# Patient Record
Sex: FEMALE | Race: BLACK OR AFRICAN AMERICAN | URBAN - METROPOLITAN AREA
[De-identification: names, ages, dates, MRNs, and addresses within clinical notes are randomized per-mention and may not be internally consistent; named-entity substitution may affect disease eponyms.]

---

## 2020-03-05 ENCOUNTER — OFFICE VISIT (OUTPATIENT)
Dept: CARDIOLOGY CLINIC | Age: 35
End: 2020-03-05

## 2020-03-05 VITALS — HEART RATE: 88 BPM | WEIGHT: 101 LBS | SYSTOLIC BLOOD PRESSURE: 108 MMHG | DIASTOLIC BLOOD PRESSURE: 72 MMHG

## 2020-03-05 DIAGNOSIS — Z72.0 TOBACCO ABUSE: ICD-10-CM

## 2020-03-05 DIAGNOSIS — R53.83 FATIGUE, UNSPECIFIED TYPE: ICD-10-CM

## 2020-03-05 DIAGNOSIS — Z86.19 HISTORY OF SHINGLES: ICD-10-CM

## 2020-03-05 DIAGNOSIS — Z86.19 H/O ROCKY MOUNTAIN SPOTTED FEVER: ICD-10-CM

## 2020-03-05 DIAGNOSIS — R07.9 CHEST PAIN, UNSPECIFIED TYPE: Primary | ICD-10-CM

## 2020-03-05 PROBLEM — A77.0 ROCKY MOUNTAIN SPOTTED FEVER: Status: ACTIVE | Noted: 2020-03-05

## 2020-03-05 PROBLEM — B02.9 SHINGLES: Status: ACTIVE | Noted: 2020-03-05

## 2020-03-05 PROBLEM — F41.9 ANXIETY: Status: ACTIVE | Noted: 2020-03-05

## 2020-03-05 RX ORDER — LORAZEPAM 0.5 MG/1
0.5 TABLET ORAL
COMMUNITY

## 2020-03-05 NOTE — PROGRESS NOTES
HISTORY OF PRESENT ILLNESS  Júnior Wu is a 27 y.o. female. [facility] spotted fever from tick bite 3/2019, diagnosed 9/2019. Repeat Shingles infections  Chest pan has been intermittent since July 2019. The chest pan occurs with activity or with rest with radiation to left arm. She denies associated symptoms of SOB, diaphoresis or nausea. New Patient   The history is provided by the patient. Associated symptoms include chest pain. Pertinent negatives include no shortness of breath. Chest Pain (Angina)    This is a recurrent problem. The current episode started more than 1 week ago. The problem has been gradually improving. Pertinent negatives include no claudication, no cough, no dizziness, no malaise/fatigue, no nausea, no orthopnea, no palpitations, no PND, no shortness of breath and no vomiting. Review of Systems   Constitutional: Negative for malaise/fatigue. HENT: Negative for congestion. Eyes: Negative for double vision and redness. Respiratory: Negative for cough, shortness of breath and wheezing. Cardiovascular: Positive for chest pain. Negative for palpitations, orthopnea, claudication, leg swelling and PND. Gastrointestinal: Negative for nausea and vomiting. Musculoskeletal: Negative for falls. Neurological: Negative for dizziness. Endo/Heme/Allergies: Does not bruise/bleed easily. Psychiatric/Behavioral: The patient is not nervous/anxious.       Allergies   Allergen Reactions    Latex, Natural Rubber Itching    Prednisone Other (comments)     Severe anxiety, loss of appetite, insomnia       Past Medical History:   Diagnosis Date    Anxiety     Murmur, cardiac        Family History   Problem Relation Age of Onset    Hypertension Father     No Known Problems Sister        Social History     Tobacco Use    Smoking status: Current Every Day Smoker     Packs/day: 1.00     Start date: 3/5/2005    Smokeless tobacco: Never Used   Substance Use Topics    Alcohol use: Not Currently    Drug use: Yes     Frequency: 1.0 times per week     Types: Marijuana        Current Outpatient Medications   Medication Sig    LORazepam (ATIVAN) 0.5 mg tablet Take 0.5 mg by mouth. No current facility-administered medications for this visit. History reviewed. No pertinent surgical history. Visit Vitals  /72   Pulse 88   Wt 45.8 kg (101 lb)       Diagnostic Studies:  I have reviewed the relevant tests done on the patient and show as follows  EKG tracings reviewed by me today. EKG Results     Procedure 720 Value Units Date/Time    AMB POC EKG ROUTINE W/ 12 LEADS, INTER & REP [[id number]] Resulted:  03/05/20 1309    Order Status:  Completed Updated:  03/05/20 1311        XR Results (most recent):  No results found for this or any previous visit. No flowsheet data found. Ms. Jared Begum has a reminder for a \"due or due soon\" health maintenance. I have asked that she contact her primary care provider for follow-up on this health maintenance. Physical Exam  Vitals signs and nursing note reviewed. HENT:      Head: Normocephalic. Neck:      Musculoskeletal: Normal range of motion. Vascular: No carotid bruit. Cardiovascular:      Rate and Rhythm: Normal rate and regular rhythm. Pulses: Normal pulses. Heart sounds: Normal heart sounds. No murmur. No friction rub. No gallop. Pulmonary:      Effort: Pulmonary effort is normal. No respiratory distress. Breath sounds: Normal breath sounds. No stridor. No wheezing, rhonchi or rales. Chest:      Chest wall: No tenderness. Musculoskeletal: Normal range of motion. Skin:     General: Skin is warm and dry. Neurological:      Mental Status: She is alert. Psychiatric:         Mood and Affect: Mood normal.         ASSESSMENT and PLAN      3/2020 Patient referred for recurrent chest pain since diagnosis of RMSF. Chest pain is intermittent and was improving until recent bout of Shingles. EKG - NSR. Will evaluate for ischemia with ACACIA and echo. Pain is possibly related to GERD and she is to f/u with GI this Month. Encouraged smoking cessation to reduce risk of future cardiovascular events. ICD-10-CM ICD-9-CM    1. Chest pain, unspecified type R07.9 786.50 AMB POC EKG ROUTINE W/ 12 LEADS, INTER & REP      EXERCISE CARDIAC STRESS TEST      ECHO ADULT COMPLETE   2. H/O [facility] spotted fever Z86.19 V12.09    3. Tobacco abuse Z72.0 305.1    4. Fatigue, unspecified type R53.83 780.79    5. History of shingles Z86.19 V12.09        There are no discontinued medications. Orders Placed This Encounter    AMB POC EKG ROUTINE W/ 12 LEADS, INTER & REP     Order Specific Question:   Reason for Exam:     Answer:   chest pain    ECHO ADULT COMPLETE     Standing Status:   Future     Standing Expiration Date:   3/5/2021     Order Specific Question:   Is Patient Pregnant? Answer:   Unknown     Order Specific Question:   Contrast Enhancement (Bubble Study, Definity, Optison) may be used if criteria listed in established evidence-based protocol has been identified. Answer:   Yes     Order Specific Question:   Enhanced Imaging (Myocardial Strain, 3D post-processing) may be used if criteria listed in established evidence-based protocol has been identified. Answer:   Yes       Follow-up and Dispositions    · Return in about 3 months (around 6/5/2020), or if symptoms worsen or fail to improve, for Post testing.         ]I have independently evaluated and examined the patient. Atypical chest pain but given the fact that it sometimes seems to radiate to left arm, get a treadmill test to evaluate ischemia. Check echo to rule out any significant pericardial effusion and cardiomyopathy. History of [facility] spotted fever last year from which she is recovering gradually. All relevant labs and testing data are reviewed. Care plan discussed and updated after review.   Ant Brumfield MD

## 2020-03-05 NOTE — PATIENT INSTRUCTIONS
Learning About Benefits From Quitting Smoking  How does quitting smoking make you healthier? If you're thinking about quitting smoking, you may have a few reasons to be smoke-free. Your health may be one of them. · When you quit smoking, you lower your risks for cancer, lung disease, heart attack, stroke, blood vessel disease, and blindness from macular degeneration. · When you're smoke-free, you get sick less often, and you heal faster. You are less likely to get colds, flu, bronchitis, and pneumonia. · As a nonsmoker, you may find that your mood is better and you are less stressed. When and how will you feel healthier? Quitting has real health benefits that start from day 1 of being smoke-free. And the longer you stay smoke-free, the healthier you get and the better you feel. The first hours  · After just 20 minutes, your blood pressure and heart rate go down. That means there's less stress on your heart and blood vessels. · Within 12 hours, the level of carbon monoxide in your blood drops back to normal. That makes room for more oxygen. With more oxygen in your body, you may notice that you have more energy than when you smoked. After 2 weeks  · Your lungs start to work better. · Your risk of heart attack starts to drop. After 1 month  · When your lungs are clear, you cough less and breathe deeper, so it's easier to be active. · Your sense of taste and smell return. That means you can enjoy food more than you have since you started smoking. Over the years  · After 1 year, your risk of heart disease is half what it would be if you kept smoking. · After 5 years, your risk of stroke starts to shrink. Within a few years after that, it's about the same as if you'd never smoked. · After 10 years, your risk of dying from lung cancer is cut by about half. And your risk for many other types of cancer is lower too. How would quitting help others in your life?   When you quit smoking, you improve the health of everyone who now breathes in your smoke. · Their heart, lung, and cancer risks drop, much like yours. · They are sick less. For babies and small children, living smoke-free means they're less likely to have ear infections, pneumonia, and bronchitis. · If you're a woman who is or will be pregnant someday, quitting smoking means a healthier . · Children who are close to you are less likely to become adult smokers. Where can you learn more? Go to http://carolyn-dhara.info/. Enter 052 [id number] [id number] 11 in the search box to learn more about \"Learning About Benefits From Quitting Smoking. \"  Current as of: 2018  Content Version: 12.2  © 3680-4564 [employer]. Care instructions adapted under license by [employer] (which disclaims liability or warranty for this information). If you have questions about a medical condition or this instruction, always ask your healthcare professional. [place] [address] any warranty or liability for your use of this information. [employer] Activation    Thank you for requesting access to [employer]. Please follow the instructions below to securely access and download your online medical record. [employer] allows you to send messages to your doctor, view your test results, renew your prescriptions, schedule appointments, and more. How Do I Sign Up? 1. In your internet browser, go to [email or web address]. 2. Click on the First Time User? Click Here link in the Sign In box. You will see the New Member Sign Up page. 3. Enter your [employer] Access Code exactly as it appears below. You will not need to use this code after youve completed the sign-up process. If you do not sign up before the expiration date, you must request a new code. [employer] Access Code: [email or web address]  Expires: 2020  1:46 PM (This is the date your [employer] access code will )    4.  Enter the last four digits of your Social Security Number (xxxx) and Date of Birth (mm/dd/yyyy) as indicated and click Submit. You will be taken to the next sign-up page. 5. Create a FaceRig ID. This will be your FaceRig login ID and cannot be changed, so think of one that is secure and easy to remember. 6. Create a FaceRig password. You can change your password at any time. 7. Enter your Password Reset Question and Answer. This can be used at a later time if you forget your password. 8. Enter your e-mail address. You will receive e-mail notification when new information is available in [address] 9. Click Sign Up. You can now view and download portions of your medical record. 10. Click the Download Summary menu link to download a portable copy of your medical information. Additional Information    If you have questions, please visit the Frequently Asked Questions section of the FaceRig website at [email or web address] Remember, FaceRig is NOT to be used for urgent needs. For medical emergencies, dial 911.

## 2022-03-19 PROBLEM — B02.9 SHINGLES: Status: ACTIVE | Noted: 2020-03-05

## 2022-03-19 PROBLEM — F41.9 ANXIETY: Status: ACTIVE | Noted: 2020-03-05

## 2022-03-20 PROBLEM — A77.0 ROCKY MOUNTAIN SPOTTED FEVER: Status: ACTIVE | Noted: 2020-03-05

## 2023-02-20 PROBLEM — G89.29 CHRONIC MIDLINE THORACIC BACK PAIN: Status: ACTIVE | Noted: 2017-04-12

## 2023-02-20 PROBLEM — M41.9 SCOLIOSIS: Status: ACTIVE | Noted: 2017-04-12

## 2023-02-20 PROBLEM — M54.6 CHRONIC MIDLINE THORACIC BACK PAIN: Status: ACTIVE | Noted: 2017-04-12

## 2023-03-13 ASSESSMENT — ENCOUNTER SYMPTOMS: SHORTNESS OF BREATH: 0

## 2023-03-13 NOTE — PROGRESS NOTES
Ivanna De Souza is a 44 y.o. female is seen on 3/15/2023 for New Patient, Other (Reoccurring shingles from tick bites), Otalgia (Throbbing pain in right ear, does have a rotten tooth scheduled for removal), and Anxiety (Caused by shingles)    Assessment & Plan:     1. Herpes zoster with other complication  Assessment & Plan:  Referral to ID for managaement  Orders:  -     External Referral To Infectious Disease  2. [place] spotted fever  Assessment & Plan:  Referral to ID for management  3. Acute ear pain, bilateral  4. Anxiety  Assessment & Plan:  Referral to psych for management    Orders:  -     CBC; Future  -     Comprehensive Metabolic Panel; Future  -     TSH; Future  -     Amb External Referral To Psychiatry  5. Screening for lipid disorders  -     Lipid Panel; Future  6. Need for hepatitis C screening test  -     Hepatitis C Antibody; Future  7. Encounter to establish care  Comments:  Former pt ot Ana Luisa Cloud NP. Follow-up and Dispositions    Return in about 4 weeks (around 4/12/2023) for PHYSICAL, LAB RESULTS.        Subjective:     HPI    Shingles/History of [facility] spotted fever  Got RMSF March 2019, had the tick bite, was diagnosed in September 2019  No rash, stated she started feeling weird, few days felt okay, when found out, wreaked havoc on body  Started getting shingles on and off for the past 3 years  Longest been without shingles is 7 months  Tick bite few years ago  Causing her to have shingles 6 times/year  Took shingles vaccine  Treatment: first prescribed antiviral valcyclocir, thought it was shutting down kidneys, protein levels were increased and stopped taking it  Next time had shingles, started acyclovir, had bad stomach cramps  Specialists: waited 7 months to get into ID doctor, was sent to [place], was looking at old lab results; was prescribed medication     Ear pain  Onset: yesterday  Left/right ear pain  Feels warm to sides of ears  Has two teeth that need root canals  May be caused by rotten tooth  Scheduled for root canal next Monday    Anxiety  Patient is seen for anxiety  Associated symptoms:  see ANN-7  Current treatment: lorazepam PRN with shingles, has severe anxiety  Followed by psychiatry: none  ANN-7 SCREENING 3/15/2023   Feeling nervous, anxious, or on edge Several days   Not being able to stop or control worrying Several days   Worrying too much about different things Several days   Trouble relaxing Several days   Being so restless that it is hard to sit still Several days   Becoming easily annoyed or irritable Several days   Feeling afraid as if something awful might happen Not at all   ANN-7 Total Score 6   How difficult have these problems made it for you to do your work, take care of things at home, or get along with other people? Somewhat difficult     Review of Systems   HENT:  Positive for ear pain. Negative for ear discharge. Respiratory:  Negative for shortness of breath. Cardiovascular:  Negative for chest pain and leg swelling. Neurological:  Negative for dizziness, light-headedness and headaches. Objective:     Vitals:    03/15/23 1527   BP: 110/73   Site: Right Upper Arm   Position: Sitting   Cuff Size: Small Adult   Pulse: 88   Resp: 16   Temp: 98.2 °F (36.8 °C)   TempSrc: Temporal   SpO2: 100%   Weight: 101 lb (45.8 kg)   Height: 5' 4.5\" (1.638 m)     Physical Exam  Vitals and nursing note reviewed. Constitutional:       General: She is not in acute distress. Appearance: She is not ill-appearing. HENT:      Head: Normocephalic and atraumatic. Right Ear: Tympanic membrane, ear canal and external ear normal.      Left Ear: Tympanic membrane, ear canal and external ear normal.   Cardiovascular:      Rate and Rhythm: Normal rate and regular rhythm. Pulmonary:      Effort: Pulmonary effort is normal. No respiratory distress. Breath sounds: No wheezing, rhonchi or rales.    Musculoskeletal:         General: Normal range of motion. Skin:     General: Skin is warm and dry. Neurological:      General: No focal deficit present. Mental Status: She is alert. Psychiatric:         Mood and Affect: Mood normal.         Thought Content:  Thought content normal.         Judgment: Judgment normal.     JUVE Mcgarry

## 2023-03-15 ENCOUNTER — OFFICE VISIT (OUTPATIENT)
Age: 38
End: 2023-03-15
Payer: COMMERCIAL

## 2023-03-15 VITALS
HEIGHT: 65 IN | DIASTOLIC BLOOD PRESSURE: 73 MMHG | BODY MASS INDEX: 16.83 KG/M2 | HEART RATE: 88 BPM | TEMPERATURE: 98.2 F | OXYGEN SATURATION: 100 % | WEIGHT: 101 LBS | RESPIRATION RATE: 16 BRPM | SYSTOLIC BLOOD PRESSURE: 110 MMHG

## 2023-03-15 DIAGNOSIS — A77.0 ROCKY MOUNTAIN SPOTTED FEVER: ICD-10-CM

## 2023-03-15 DIAGNOSIS — B02.8 HERPES ZOSTER WITH OTHER COMPLICATION: Primary | ICD-10-CM

## 2023-03-15 DIAGNOSIS — Z13.220 SCREENING FOR LIPID DISORDERS: ICD-10-CM

## 2023-03-15 DIAGNOSIS — H92.03 ACUTE EAR PAIN, BILATERAL: ICD-10-CM

## 2023-03-15 DIAGNOSIS — F41.9 ANXIETY: ICD-10-CM

## 2023-03-15 DIAGNOSIS — Z11.59 NEED FOR HEPATITIS C SCREENING TEST: ICD-10-CM

## 2023-03-15 DIAGNOSIS — Z76.89 ENCOUNTER TO ESTABLISH CARE: ICD-10-CM

## 2023-03-15 PROCEDURE — 99204 OFFICE O/P NEW MOD 45 MIN: CPT

## 2023-03-15 SDOH — ECONOMIC STABILITY: FOOD INSECURITY: WITHIN THE PAST 12 MONTHS, YOU WORRIED THAT YOUR FOOD WOULD RUN OUT BEFORE YOU GOT MONEY TO BUY MORE.: SOMETIMES TRUE

## 2023-03-15 SDOH — ECONOMIC STABILITY: FOOD INSECURITY: WITHIN THE PAST 12 MONTHS, THE FOOD YOU BOUGHT JUST DIDN'T LAST AND YOU DIDN'T HAVE MONEY TO GET MORE.: SOMETIMES TRUE

## 2023-03-15 SDOH — ECONOMIC STABILITY: HOUSING INSECURITY
IN THE LAST 12 MONTHS, WAS THERE A TIME WHEN YOU DID NOT HAVE A STEADY PLACE TO SLEEP OR SLEPT IN A SHELTER (INCLUDING NOW)?: NO

## 2023-03-15 SDOH — ECONOMIC STABILITY: INCOME INSECURITY: HOW HARD IS IT FOR YOU TO PAY FOR THE VERY BASICS LIKE FOOD, HOUSING, MEDICAL CARE, AND HEATING?: SOMEWHAT HARD

## 2023-03-15 ASSESSMENT — ANXIETY QUESTIONNAIRES
3. WORRYING TOO MUCH ABOUT DIFFERENT THINGS: 1
2. NOT BEING ABLE TO STOP OR CONTROL WORRYING: 1
IF YOU CHECKED OFF ANY PROBLEMS ON THIS QUESTIONNAIRE, HOW DIFFICULT HAVE THESE PROBLEMS MADE IT FOR YOU TO DO YOUR WORK, TAKE CARE OF THINGS AT HOME, OR GET ALONG WITH OTHER PEOPLE: SOMEWHAT DIFFICULT
4. TROUBLE RELAXING: 1
7. FEELING AFRAID AS IF SOMETHING AWFUL MIGHT HAPPEN: 0
GAD7 TOTAL SCORE: 6
5. BEING SO RESTLESS THAT IT IS HARD TO SIT STILL: 1
6. BECOMING EASILY ANNOYED OR IRRITABLE: 1
1. FEELING NERVOUS, ANXIOUS, OR ON EDGE: 1

## 2023-03-15 ASSESSMENT — PATIENT HEALTH QUESTIONNAIRE - PHQ9
SUM OF ALL RESPONSES TO PHQ QUESTIONS 1-9: 0
1. LITTLE INTEREST OR PLEASURE IN DOING THINGS: 0
SUM OF ALL RESPONSES TO PHQ QUESTIONS 1-9: 0
SUM OF ALL RESPONSES TO PHQ9 QUESTIONS 1 & 2: 0
SUM OF ALL RESPONSES TO PHQ QUESTIONS 1-9: 0
SUM OF ALL RESPONSES TO PHQ QUESTIONS 1-9: 0
2. FEELING DOWN, DEPRESSED OR HOPELESS: 0

## 2023-03-15 NOTE — PROGRESS NOTES
Chief Complaint   Patient presents with    New Patient    Other     Reoccurring shingles from tick bites    Otalgia     Throbbing pain in right ear, does have a rotten tooth scheduled for removal    Anxiety     Caused by shingles

## 2023-03-15 NOTE — PATIENT INSTRUCTIONS
FINANCIAL RESOURCES    [facility] Financial Assistance  What they offer: The [employer] Program helps uninsured patients who do not qualify for government-sponsored health insurance and cannot afford to pay for their medical care. Insured patient may also qualify for assistance based on family income, family size, and medical needs. Phone Number: [phone]  How to apply for the [employer] Program:            #    Option 1: To apply for financial assistance, a patient (or their family or other provider) should fill out the Financial Assistance Application. Copies of the Financial Assistance Application and the FAP may be obtained for free by calling the [facility] customer service department at [phone]. #    Option 2:  The Financial Assistance Application and policy may be obtained for free by downloading a copy from the JethroData:                     Twibingo/patient-resources/financial-assistance                     #   Applications are available in several languages on the website    The Folloze. iFrat Warss. org  What they offer: The Local Plant SourceAlejandraMotionsoft Program can assist you if youre uninsured and have been diagnosed with chronic, debilitating, or life-threatening disease. Phone Number: 3-715.791.1978  Website: www. Nexavis    UTILITIES    CommonHelp  What they offer: Partnership with the [place] [address]. Assist with   finding and applying for government funded programs and benefits. You can also update  your benefits or report changes through [employer].   Website: Innal[employer] Holding.Red Blue Voice  Phone Number: 860-2WOXELF ([phone])      Fabricio 99 they offer: Krystal Chi is LifePoint HealthReveal energy assistance program of last resort for                 anyone who faces financial hardships from unemployment or family crisis. o Phone Number: [phone]        o Address: [place], [address]        o Website: Arran Aromatics/[place]/billing/billing-options/energyshare     Energy Assistance Programs (EAP) - Department of           o What they offer: EAP assists low income households in meeting their immediate home energy needs. o Phone Number: 390.603.8474 or contact your local department of         o Website: https://VarVeener.Mapiliary/     Good Rx   o What they offer: Good Rx tracks prescription drug prices and provides free drug coupons for discounts on medications. o Website: Influx/    NeedyMeds   o What they offer: NeedyMeds offers free information on medications and healthcare cost savings programs including prescription assistance programs, coupons, and discount programs. o Website: PaymentBack.VideoNot.es org/   o Helpline: 425.368.3059     RX Assist   o What they offer: Information about free and low-cost medicine programs. o Website: https://Sedia Biosciences/    [employer] $4 Prescription Program   o What they offer: Prescription Program includes up to a 30-day supply for $4 and a 90-day supply for $10 of some covered generic drugs at commonly prescribed dosages   o Website: Kelsie      The Co-Pay Relief Program  What they offer: The Co-Pay Relief Program provides you with direct prescription co-payment assistance, if you are an insured [place] citizen and financially and medically qualify, including Medicare Part D beneficiaries who require assistance with their prescription drug co-payments. Phone toll-free: 0-280.393.3118  Website: www.Dwolla. org    The Partnership for Prescription Assistance   What they offer:   The Partnership for Prescription Assistance can help you if you lack Prescription coverage to get the medicines you need through the public or private program that is right for you. Phone toll-free: 7-804.722.5129  Website:  www. pparx. [address]  o    What they offer: The [address] [place] [address] gives you and your family access to a free Prescription Drug Card program and savings of up to 75% at more than 50,000 national and The [employer]. Phone toll-free: 4-124.534.7712  Website: www.Share Your Brain    additional resources? Call 211 or Find Help: https://www. atOnePlace.com.Fuzz/ FOOD RESOURCES    [facility]  What they offer: Assistance with finding a food pantry, soup kitchen or resource near you. Website: https://Vacatia.org/get-help/community-resources/  Provide instructions for assistance with Karthik Weathers (Food [place]) application on this site. SNAP ([address])  What they offer: SNAP is used like cash to buy eligible food items from authorized retailers. Apply for benefits online: Seun.  Apply for benefits by telephone: [phone]    Meals on Wheels  What they offer: Meals on Wheels is a program that delivers meals to individuals at home who are unable to purchase or prepare their own meals. Website: https://First Coveragemore. org/how-we-help/meals-on-wheels/  Requirements can vary by program and areas served. To apply:  [address] [place] [address] Avenue: [phone] (COG -Fri 8:30 a.m. to 4:30 p.m.)  Coverage Area:  [facility], [address] [place] [address] [address] [facility],[address]  Website: www.ssseva.org/contact-us/  Tamara Shetty 39 On [phone]   Coverage Areas: [place], [address] [place] [address], [place], [place]. [facility] [address]  What they offer:  [facility]asis provides comprehensive services to the disadvantaged/low-income community, homebound seniors and homeless in [place], [facility], and [facility].   Phone Number: [phone]  Services:  Food pantry, [address], Senior Grocery Delivery Program, Food Bank, Provides assistance with completing [employer]. Website: https://www.Wochit/. org/services    Need additional resources? Call 211 or Find Help: https://www. findhelp.org/

## 2023-04-16 PROBLEM — Z00.00 ANNUAL PHYSICAL EXAM: Status: ACTIVE | Noted: 2023-04-16

## 2023-04-16 PROBLEM — Z00.00 ANNUAL PHYSICAL EXAM: Status: RESOLVED | Noted: 2023-04-16 | Resolved: 2023-04-16

## 2023-04-16 ASSESSMENT — ENCOUNTER SYMPTOMS
ABDOMINAL PAIN: 0
BLOOD IN STOOL: 0
CONSTIPATION: 0
VOMITING: 0
DIARRHEA: 0
COUGH: 0
SHORTNESS OF BREATH: 0
NAUSEA: 0
CHEST TIGHTNESS: 0

## 2023-04-17 ENCOUNTER — OFFICE VISIT (OUTPATIENT)
Facility: CLINIC | Age: 38
End: 2023-04-17
Payer: COMMERCIAL

## 2023-04-17 VITALS
SYSTOLIC BLOOD PRESSURE: 111 MMHG | HEART RATE: 86 BPM | DIASTOLIC BLOOD PRESSURE: 71 MMHG | OXYGEN SATURATION: 100 % | RESPIRATION RATE: 16 BRPM | BODY MASS INDEX: 16.66 KG/M2 | HEIGHT: 65 IN | WEIGHT: 100 LBS | TEMPERATURE: 98.2 F

## 2023-04-17 DIAGNOSIS — B02.9 HERPES ZOSTER WITHOUT COMPLICATION: ICD-10-CM

## 2023-04-17 DIAGNOSIS — Z71.2 ENCOUNTER TO DISCUSS TEST RESULTS: ICD-10-CM

## 2023-04-17 DIAGNOSIS — F41.9 ANXIETY: ICD-10-CM

## 2023-04-17 DIAGNOSIS — Z00.00 ANNUAL PHYSICAL EXAM: Primary | ICD-10-CM

## 2023-04-17 PROCEDURE — 99395 PREV VISIT EST AGE 18-39: CPT

## 2023-04-17 PROCEDURE — 99213 OFFICE O/P EST LOW 20 MIN: CPT

## 2023-04-17 ASSESSMENT — PATIENT HEALTH QUESTIONNAIRE - PHQ9
1. LITTLE INTEREST OR PLEASURE IN DOING THINGS: 0
SUM OF ALL RESPONSES TO PHQ QUESTIONS 1-9: 0
SUM OF ALL RESPONSES TO PHQ9 QUESTIONS 1 & 2: 0
SUM OF ALL RESPONSES TO PHQ QUESTIONS 1-9: 0
2. FEELING DOWN, DEPRESSED OR HOPELESS: 0

## 2023-04-17 NOTE — PROGRESS NOTES
Determinants of Health     Tobacco Use: High Risk    Smoking Tobacco Use: Every Day    Smokeless Tobacco Use: Never    Passive Exposure: Not on file   Alcohol Use: Not on file   Financial Resource Strain: Medium Risk    Difficulty of Paying Living Expenses: Somewhat hard   Food Insecurity: Food Insecurity Present    Worried About Running Out of Food in the Last Year: Sometimes true    Ran Out of Food in the Last Year: Sometimes true   Transportation Needs: Unknown    Lack of Transportation (Medical): Not on file    Lack of Transportation (Non-Medical): No   Physical Activity: Not on file   Stress: Not on file   Social Connections: Not on file   Intimate Partner Violence: Not on file   Depression: Not at risk    PHQ-2 Score: 0   Housing Stability: Unknown    Unable to Pay for Housing in the Last Year: Not on file    Number of Places Lived in the Last Year: Not on file    Unstable Housing in the Last Year: No        Health Maintenance Due   Topic Date Due    COVID-19 Vaccine (1) Never done    Varicella vaccine (1 of 2 - 2-dose childhood series) Never done    Pneumococcal 0-64 years Vaccine (1 - PCV) Never done    HIV screen  Never done    DTaP/Tdap/Td vaccine (1 - Tdap) Never done    Cervical cancer screen  03/16/2023   . Coordination of Care:  1. Have you been to the ER, urgent care clinic since your last visit? Hospitalized since your last visit? no    2. Have you seen or consulted any other health care providers outside of the [address] [place] [address] since your last visit? Include any pap smears or colon screening.  no

## 2023-05-08 ENCOUNTER — TELEPHONE (OUTPATIENT)
Facility: CLINIC | Age: 38
End: 2023-05-08

## 2023-05-08 DIAGNOSIS — B02.9 HERPES ZOSTER WITHOUT COMPLICATION: Primary | ICD-10-CM

## 2023-05-08 NOTE — TELEPHONE ENCOUNTER
Called [facility] and spoke to [facility] who says she does not have the referral from 3/15/2023 on file. I verified fax number listed on previous referral and she stated that it was correct. Informed Preeti I will ask TAMI Neff to put in a new referral and fax it over today and attention to Dr. Ryan Morton.

## 2023-05-29 NOTE — PROGRESS NOTES
Chief Complaint   Patient presents with    Gynecologic Exam    Melanoma     Pt states she has a mole r. Side of chest that has changed in colors was previously skin color then changed to a dark black     Assessment & Plan:     1. Dysplastic nevus of skin  Assessment & Plan:  Referral to dermatology for management  Orders:  -     Amb External Referral To Dermatology  2. Other postherpetic nervous system involvement  Assessment & Plan:  Stable  Referral to immunology for management  Orders:  -     External Referral To Allergy  3. Anxiety  Assessment & Plan:  Continue management per psychiatry  4. Vaginal discharge  -     Nuswab Vaginitis with Candida (Six Species); Future  5. Well woman exam  -     PAP IG, Aptima HPV and rfx 16/18,45 (641004); Future  6. Encounter for completion of form with patient  Comments:  Completed [facility] Medical Evaluation form     Follow-up and Dispositions    Return in about 3 months (around 2023) for anxiety, change in mole, shingles.        Subjective:     HPI    OB/Gyn Hx  Last PAP: 4-5 years ago  Hx of abnormal PAP: none  STDs: history of HPV    Date of LMP: 6 weeks ago  Birth Control: none  Hysterectomy: none  Family hx of breast/cervical/uterine/ovarian cancer: mother (breast)  Breast complaints: none  Vaginal symptoms: none    Mole  Onset: has had it for awhile  Location: left upper abdomen  Duration: worse over the last year  Characteristics: no pain but itchy, was tan in color, and is dark  Associated symptoms: none    Shingles  States she is unable to work d/t shingles pain  Has extreme fatigue  Physical limitations: unable to sit for long than 20 minutes at a time  Has not received varicella vaccine    Anxiety  Met with psychiatry, trying to prescibe something  Last appointment: 23  Next appointment: 23  Psych will work on finding therapist  Anxiety: stress can trigger shingles    Objective:     Vitals:    23 1440   BP: 112/76   Site: Right Upper Arm

## 2023-05-31 ENCOUNTER — HOSPITAL ENCOUNTER (OUTPATIENT)
Facility: HOSPITAL | Age: 38
Setting detail: SPECIMEN
Discharge: HOME | End: 2023-06-03
Payer: COMMERCIAL

## 2023-05-31 ENCOUNTER — OFFICE VISIT (OUTPATIENT)
Facility: CLINIC | Age: 38
End: 2023-05-31
Payer: COMMERCIAL

## 2023-05-31 VITALS
HEIGHT: 65 IN | SYSTOLIC BLOOD PRESSURE: 112 MMHG | WEIGHT: 101 LBS | OXYGEN SATURATION: 98 % | TEMPERATURE: 98.4 F | RESPIRATION RATE: 16 BRPM | BODY MASS INDEX: 16.83 KG/M2 | HEART RATE: 91 BPM | DIASTOLIC BLOOD PRESSURE: 76 MMHG

## 2023-05-31 DIAGNOSIS — N89.8 VAGINAL DISCHARGE: ICD-10-CM

## 2023-05-31 DIAGNOSIS — Z02.89 ENCOUNTER FOR COMPLETION OF FORM WITH PATIENT: ICD-10-CM

## 2023-05-31 DIAGNOSIS — F41.9 ANXIETY: ICD-10-CM

## 2023-05-31 DIAGNOSIS — B02.29 OTHER POSTHERPETIC NERVOUS SYSTEM INVOLVEMENT: ICD-10-CM

## 2023-05-31 DIAGNOSIS — D23.9 DYSPLASTIC NEVUS OF SKIN: Primary | ICD-10-CM

## 2023-05-31 DIAGNOSIS — D23.9 DYSPLASTIC NEVUS OF SKIN: ICD-10-CM

## 2023-05-31 DIAGNOSIS — Z01.419 WELL WOMAN EXAM: ICD-10-CM

## 2023-05-31 PROCEDURE — 88175 CYTOPATH C/V AUTO FLUID REDO: CPT

## 2023-05-31 PROCEDURE — 99215 OFFICE O/P EST HI 40 MIN: CPT

## 2023-05-31 PROCEDURE — 87801 DETECT AGNT MULT DNA AMPLI: CPT

## 2023-05-31 PROCEDURE — 87624 HPV HI-RISK TYP POOLED RSLT: CPT

## 2023-05-31 PROCEDURE — 87798 DETECT AGENT NOS DNA AMP: CPT

## 2023-05-31 PROCEDURE — 87661 TRICHOMONAS VAGINALIS AMPLIF: CPT

## 2023-05-31 NOTE — PROGRESS NOTES
Qamar Rich presents today for   Chief Complaint   Patient presents with    Gynecologic Exam    Melanoma     Pt states she has a mole r. Side of chest that has changed in colors was previously skin color then changed to a dark black       Is someone accompanying this pt? no    Is the patient using any DME equipment during [address]? no    Depression Screening:  PHQ-9 Questionaire 4/17/2023 3/15/2023   Little interest or pleasure in doing things 0 0   Feeling down, depressed, or hopeless 0 0   PHQ-9 Total Score 0 0        ANN 7-Anxiety   ANN-7 SCREENING 3/15/2023   Feeling nervous, anxious, or on edge Several days   Not being able to stop or control worrying Several days   Worrying too much about different things Several days   Trouble relaxing Several days   Being so restless that it is hard to sit still Several days   Becoming easily annoyed or irritable Several days   Feeling afraid as if something awful might happen Not at all   ANN-7 Total Score 6   How difficult have these problems made it for you to do your work, take care of things at home, or get along with other people? Somewhat difficult        Learning Assessment:  No question data found. Fall Risk  No flowsheet data found. Travel Screening:    Travel Screening     No screening recorded since 05/30/23 0000       Travel History   Travel since 04/30/23    No documented travel since 04/30/23          Health Maintenance reviewed and discussed and ordered per Provider.   Social Determinants of Health     Tobacco Use: High Risk    Smoking Tobacco Use: Every Day    Smokeless Tobacco Use: Never    Passive Exposure: Not on file   Alcohol Use: Not on file   Financial Resource Strain: Medium Risk    Difficulty of Paying Living Expenses: Somewhat hard   Food Insecurity: Food Insecurity Present    Worried About Running Out of Food in the Last Year: Sometimes true    Ran Out of Food in the Last Year: Sometimes true   Transportation Needs: Unknown    Lack of

## 2023-06-01 PROBLEM — N89.8 VAGINAL DISCHARGE: Status: ACTIVE | Noted: 2023-06-01

## 2023-06-04 LAB
A VAGINAE DNA VAG QL NAA+PROBE: NORMAL SCORE
BACTERIAL VAGINOSIS, NAA: NORMAL
BVAB2 DNA VAG QL NAA+PROBE: NORMAL SCORE
C ALBICANS DNA VAG QL NAA+PROBE: NEGATIVE
C GLABRATA DNA VAG QL NAA+PROBE: NEGATIVE
CANDIDA KRUSEI: NEGATIVE
CANDIDA LUSITANIAE, NAA, 180015: NEGATIVE
CANDIDA PARAPSILOSIS/TROPICALIS: NEGATIVE
MEGA1 DNA VAG QL NAA+PROBE: NORMAL SCORE
SPECIMEN SOURCE: NORMAL
T VAGINALIS RRNA SPEC QL NAA+PROBE: NEGATIVE

## 2023-06-05 ENCOUNTER — TELEPHONE (OUTPATIENT)
Facility: CLINIC | Age: 38
End: 2023-06-05

## 2023-06-05 NOTE — TELEPHONE ENCOUNTER
[facility] called and states they do not see patients for Other postherpetic nervous system involvement. Please advise.

## 2023-08-28 PROBLEM — F41.1 GENERALIZED ANXIETY DISORDER: Status: ACTIVE | Noted: 2020-03-05

## 2023-08-28 ASSESSMENT — ENCOUNTER SYMPTOMS: SHORTNESS OF BREATH: 0

## 2023-08-28 NOTE — PROGRESS NOTES
Chief Complaint   Patient presents with    Anxiety    Mole     Dermatologists stated mole was not cancerous    Herpes Zoster     Pt states she had a breakout last Monday. States she went 4 months without a breakout. Assessment & Plan:     1. Dysplastic nevus of skin  Assessment & Plan:  Improvement  Continue management per dermatology   2. Shingles (herpes zoster) polyneuropathy  Assessment & Plan:  Start meloxicam for PRN pain  Unable to start gabapentin, as pt smokes marijuana  Referral to [facility] asthma, allergy, and immunology for management  Orders:  -     meloxicam (MOBIC) 7.5 MG tablet; Take 1 tablet by mouth daily, Disp-60 tablet, R-0Normal  -     External Referral To Allergy  3. Generalized anxiety disorder  Assessment & Plan:  Continue management per psychiatry   Orders:  -     External Referral To Psychiatry    Follow-up and Dispositions    Return in about 3 months (around 11/30/2023) for Shingles. Subjective:     HPI    Change in mole  Followed by dermatology: yes, was told it was not a mole  Stated it was closest thing to cancer, that is not a mole  Stated did not need a biopsy, did do a full body check    Shingles  Symptoms: last month had a breakout, states its completely healed, and does hurt  Cannot find a spot, sometimes feels like something is burning   Was prescribed opiate medication the first time she had shingles, did not like how she made her feel  Has not gotten the shingles vaccine, was told not to get the shingles vaccine in the past  Followed by immunology: none    Anxiety  Had a hard time finding a psychiatrist  Saw a psychiatrist, was told to stop marijuana smoking  Saw another psychiatrist, saw her once but next appt was cancelled, had issues with insurance  Was given prescription for 10 tablets of Ativan    Review of Systems   Respiratory:  Negative for shortness of breath. Cardiovascular:  Negative for chest pain and leg swelling.    Neurological:  Negative for dizziness,

## 2023-08-28 NOTE — ASSESSMENT & PLAN NOTE
Start meloxicam for PRN pain  Unable to start gabapentin, as pt smokes marijuana  Referral to [facility] asthma, allergy, and immunology for management

## 2023-08-30 ENCOUNTER — OFFICE VISIT (OUTPATIENT)
Facility: CLINIC | Age: 38
End: 2023-08-30
Payer: COMMERCIAL

## 2023-08-30 VITALS
TEMPERATURE: 97.9 F | OXYGEN SATURATION: 98 % | BODY MASS INDEX: 16.89 KG/M2 | HEIGHT: 65 IN | RESPIRATION RATE: 17 BRPM | HEART RATE: 82 BPM | WEIGHT: 101.4 LBS | DIASTOLIC BLOOD PRESSURE: 69 MMHG | SYSTOLIC BLOOD PRESSURE: 104 MMHG

## 2023-08-30 DIAGNOSIS — F41.1 GENERALIZED ANXIETY DISORDER: ICD-10-CM

## 2023-08-30 DIAGNOSIS — B02.23 SHINGLES (HERPES ZOSTER) POLYNEUROPATHY: ICD-10-CM

## 2023-08-30 DIAGNOSIS — D23.9 DYSPLASTIC NEVUS OF SKIN: Primary | ICD-10-CM

## 2023-08-30 PROBLEM — N89.8 VAGINAL DISCHARGE: Status: RESOLVED | Noted: 2023-06-01 | Resolved: 2023-08-30

## 2023-08-30 PROCEDURE — 99214 OFFICE O/P EST MOD 30 MIN: CPT

## 2023-08-30 RX ORDER — MELOXICAM 7.5 MG/1
7.5 TABLET ORAL DAILY
Qty: 60 TABLET | Refills: 0 | Status: SHIPPED | OUTPATIENT
Start: 2023-08-30

## 2023-08-30 ASSESSMENT — PATIENT HEALTH QUESTIONNAIRE - PHQ9
2. FEELING DOWN, DEPRESSED OR HOPELESS: 0
SUM OF ALL RESPONSES TO PHQ QUESTIONS 1-9: 0
SUM OF ALL RESPONSES TO PHQ QUESTIONS 1-9: 0
SUM OF ALL RESPONSES TO PHQ9 QUESTIONS 1 & 2: 0
SUM OF ALL RESPONSES TO PHQ QUESTIONS 1-9: 0
SUM OF ALL RESPONSES TO PHQ QUESTIONS 1-9: 0
1. LITTLE INTEREST OR PLEASURE IN DOING THINGS: 0

## 2023-08-30 NOTE — PROGRESS NOTES
Jamse Nails presents today for   Chief Complaint   Patient presents with    Anxiety    Mole     Dermatologists stated mole was not cancerous    Herpes Zoster     Pt states she had a breakout last Monday. States she went 4 months without a breakout. Is someone accompanying this pt? no    Is the patient using any DME equipment during [facility]? no    Depression Screening:  PHQ-9 Questionaire 8/30/2023 4/17/2023 3/15/2023   Little interest or pleasure in doing things 0 0 0   Feeling down, depressed, or hopeless 0 0 0   PHQ-9 Total Score 0 0 0        ANN 7-Anxiety   ANN-7 SCREENING 3/15/2023   Feeling nervous, anxious, or on edge Several days   Not being able to stop or control worrying Several days   Worrying too much about different things Several days   Trouble relaxing Several days   Being so restless that it is hard to sit still Several days   Becoming easily annoyed or irritable Several days   Feeling afraid as if something awful might happen Not at all   ANN-7 Total Score 6   How difficult have these problems made it for you to do your work, take care of things at home, or get along with other people? Somewhat difficult        Learning Assessment:  No question data found. Fall Risk  No flowsheet data found. Travel Screening:    Travel Screening       Question Response    In the last 10 days, have you been in contact with someone who was confirmed or suspected to have Coronavirus/COVID-19? --    Have you had a COVID-19 viral test in the last 10 days? No    Do you have any of the following new or worsening symptoms? None of these    Have you traveled internationally or domestically in the last month? No          Travel History   Travel since 07/30/23    No documented travel since 07/30/23          Health Maintenance reviewed and discussed and ordered per Provider.   Social Determinants of Health     Tobacco Use: High Risk    Smoking Tobacco Use: Every Day    Smokeless Tobacco Use: Never    Passive

## 2023-10-18 ENCOUNTER — TELEPHONE (OUTPATIENT)
Facility: CLINIC | Age: 38
End: 2023-10-18

## 2023-10-18 NOTE — TELEPHONE ENCOUNTER
Pt called and advised referral for [facility] was sent out on Sept 4, 2023.  Pt given [facility] number to call to check on referral. [phone]

## 2023-10-18 NOTE — TELEPHONE ENCOUNTER
Pt called in to let milana dyer know that she still hasn't heard from the specialists states that np told her to call back if she didn't

## 2023-11-03 ENCOUNTER — TELEPHONE (OUTPATIENT)
Facility: CLINIC | Age: 38
End: 2023-11-03

## 2023-11-03 NOTE — TELEPHONE ENCOUNTER
Pt called in stating that her referral that was sent to immunology was not sent to the right fax number [phone] she called to get schd and they said it was not received the right fax number and then they also wants recent labs and office notes and a report stating why she needs to see a immunologist

## 2023-11-07 ENCOUNTER — TELEPHONE (OUTPATIENT)
Facility: CLINIC | Age: 38
End: 2023-11-07

## 2023-11-07 NOTE — TELEPHONE ENCOUNTER
Meghan Sofia returned call from [facility]. She states that the allergist would deny the referral due to shingles.    She states that the decision came directly from the allergist.

## 2023-11-07 NOTE — TELEPHONE ENCOUNTER
Called the [facility] office and they states that Maricruz Larsen will give me a call back in regards to patient. Maricruz Larsen returned call. She states that the allergist looked over referral however, it is not suitable for an allergist nor immunology. She states that the referral did not have much information. Explained to Maricruz Larsen that the patient was coming there for shingles per provider notes. She states that she will e-mail the allergist to see if that will suffice if not she will give our office a call back.

## 2023-11-07 NOTE — TELEPHONE ENCOUNTER
Meghan Sofia called from [facility] stating that the referral that was sent over an Immunologist looked over the referral and stated that it's not allergy nor immunology appropriate and not sure what the patient is being seen for.  She said to give their office a call back if you have any questions [phone]

## 2023-11-22 ASSESSMENT — ENCOUNTER SYMPTOMS: SHORTNESS OF BREATH: 0

## 2023-11-27 ENCOUNTER — OFFICE VISIT (OUTPATIENT)
Facility: CLINIC | Age: 38
End: 2023-11-27
Payer: COMMERCIAL

## 2023-11-27 VITALS
SYSTOLIC BLOOD PRESSURE: 96 MMHG | HEART RATE: 88 BPM | WEIGHT: 104 LBS | OXYGEN SATURATION: 97 % | DIASTOLIC BLOOD PRESSURE: 65 MMHG | TEMPERATURE: 97.2 F | HEIGHT: 65 IN | BODY MASS INDEX: 17.33 KG/M2

## 2023-11-27 DIAGNOSIS — M41.87 OTHER FORM OF SCOLIOSIS OF LUMBOSACRAL SPINE: ICD-10-CM

## 2023-11-27 DIAGNOSIS — B02.8 HERPES ZOSTER WITH COMPLICATION: Primary | ICD-10-CM

## 2023-11-27 PROCEDURE — 99213 OFFICE O/P EST LOW 20 MIN: CPT

## 2023-11-27 ASSESSMENT — PATIENT HEALTH QUESTIONNAIRE - PHQ9
SUM OF ALL RESPONSES TO PHQ QUESTIONS 1-9: 0
SUM OF ALL RESPONSES TO PHQ QUESTIONS 1-9: 0
SUM OF ALL RESPONSES TO PHQ9 QUESTIONS 1 & 2: 0
SUM OF ALL RESPONSES TO PHQ QUESTIONS 1-9: 0
SUM OF ALL RESPONSES TO PHQ QUESTIONS 1-9: 0
2. FEELING DOWN, DEPRESSED OR HOPELESS: 0
1. LITTLE INTEREST OR PLEASURE IN DOING THINGS: 0

## 2023-11-27 ASSESSMENT — ENCOUNTER SYMPTOMS: BACK PAIN: 1

## 2023-11-27 NOTE — ASSESSMENT & PLAN NOTE
Continue current regimen  Advised pt to bring in copies of previous xrays to next appointment, to determine if spine is pressing against adrenal gland

## 2023-11-27 NOTE — ASSESSMENT & PLAN NOTE
Check varicella antibody  Check Rh factor, CRP, sed rate to r/o rheumatologic disorder d/t ongoing fatigue, weakness  Declined by [facility] immunology, allergy  Advised pt to research allergists, immunologist, specialists, and will order referral

## 2023-12-26 NOTE — PROGRESS NOTES
Crystal Bailey is a 43 y.o. female who was seen by synchronous (real-time) audio-video technology on 12/27/2023 for Other (Food stamp form )    Assessment & Plan:     1. Herpes zoster with complication  Assessment & Plan:  Advised pt to complete previously ordered labs (varicella antibody, RH factor, CRP, sed rate)  Advised pt to continue to research allergists, immunologists, and specialists, and will order referral  Orders:  -     CBC with Auto Differential; Future  -     TSH; Future  -     External Referral To Endocrinology  2. Ke[place]'s disease [facility])  Assessment & Plan:  Symptomatic  Referral to endocrinologist for work up   Orders:  -     CBC with Auto Differential; Future  -     Comprehensive Metabolic Panel; Future  -     TSH; Future  -     External Referral To Endocrinology  3. Chronic midline thoracic back pain  Assessment & Plan:  Persistent  Check thoracic xray  Referral to PT and ortho for management   Orders:  [facility] In [address], [address]     [address] [facility] [address] [facility])  -     XR THORACIC SPINE (2 VIEWS); Future  -     CBC with Auto Differential; Future  -     Comprehensive Metabolic Panel; Future  -     TSH; Future  4. Encounter for completion of form with patient     Follow-up and Dispositions    Return in about 4 weeks (around 1/24/2024) for blood pressure, shingles, back pain, osakr's .        SUBJECTIVE:     HPI    Shingles  States she called a specialist for shingles: and is awaiting call from them back  Followed by endocrinologist: is awaiting a call  States she researched and states she found that oskar's disease could cause a rash resembling shingles  States she is unable to take prednisone    Keewatin's disease  States she has not been tested for oskar's disease  Low blood pressure   Weight loss: states she has lost weight and can't gain weight, normal weight is 110lbs, max weight

## 2023-12-27 ENCOUNTER — TELEMEDICINE (OUTPATIENT)
Facility: CLINIC | Age: 38
End: 2023-12-27
Payer: COMMERCIAL

## 2023-12-27 ENCOUNTER — TELEPHONE (OUTPATIENT)
Facility: CLINIC | Age: 38
End: 2023-12-27

## 2023-12-27 DIAGNOSIS — M54.6 CHRONIC MIDLINE THORACIC BACK PAIN: ICD-10-CM

## 2023-12-27 DIAGNOSIS — Z02.89 ENCOUNTER FOR COMPLETION OF FORM WITH PATIENT: ICD-10-CM

## 2023-12-27 DIAGNOSIS — G89.29 CHRONIC MIDLINE THORACIC BACK PAIN: ICD-10-CM

## 2023-12-27 DIAGNOSIS — E27.1 ADDISON'S DISEASE (HCC): ICD-10-CM

## 2023-12-27 DIAGNOSIS — B02.8 HERPES ZOSTER WITH COMPLICATION: Primary | ICD-10-CM

## 2023-12-27 PROCEDURE — 99215 OFFICE O/P EST HI 40 MIN: CPT

## 2023-12-27 NOTE — TELEPHONE ENCOUNTER
Please advise pt form is completed and ready for . Please give patient order for thoracic xray, and labs ordered 12/27/23 and 11/27/23 for completion. Thank you!

## 2023-12-27 NOTE — PROGRESS NOTES
VITA ZAPITANO presents today for   Chief Complaint   Patient presents with    Other     Food stamp form        Virtual/telephone visit    Is someone accompanying this pt? no      Depression Screenin/27/2023     1:46 PM 2023     3:30 PM 2023     2:47 PM 3/15/2023     3:40 PM   PHQ-9 Questionaire   Little interest or pleasure in doing things 0 0 0 0   Feeling down, depressed, or hopeless 0 0 0 0   PHQ-9 Total Score 0 0 0 0        ANN 7-Anxiety       2023     9:44 AM 3/15/2023     3:41 PM   ANN-7 SCREENING   Feeling nervous, anxious, or on edge Not at all Several days   Not being able to stop or control worrying Not at all Several days   Worrying too much about different things Not at all Several days   Trouble relaxing Not at all Several days   Being so restless that it is hard to sit still Not at all Several days   Becoming easily annoyed or irritable Not at all Several days   Feeling afraid as if something awful might happen Not at all Not at all   ANN-7 Total Score 0 6   How difficult have these problems made it for you to do your work, take care of things at home, or get along with other people? Not difficult at all Somewhat difficult        Learning Assessment:  No question data found. Fall Risk       No data to display                   Travel Screening:    Travel Screening       Question Response    Have you been in contact with someone who was sick? No / Unsure    Do you have any of the following new or worsening symptoms? None of these    Have you traveled internationally or domestically in the last month? No          Travel History   Travel since 23    No documented travel since 23          Health Maintenance reviewed and discussed and ordered per Provider.   Social Determinants of Health     Tobacco Use: High Risk (2023)    Patient History     Smoking Tobacco Use: Every Day     Smokeless Tobacco Use: Never     Passive Exposure: Not on file   Alcohol Use: Not on

## 2023-12-27 NOTE — ASSESSMENT & PLAN NOTE
Advised pt to complete previously ordered labs (varicella antibody, RH factor, CRP, sed rate)  Advised pt to continue to research allergists, immunologists, and specialists, and will order referral

## 2024-01-15 ENCOUNTER — HOSPITAL ENCOUNTER (OUTPATIENT)
Facility: HOSPITAL | Age: 39
Discharge: HOME | End: 2024-01-18
Payer: COMMERCIAL

## 2024-01-15 DIAGNOSIS — M54.6 CHRONIC MIDLINE THORACIC BACK PAIN: ICD-10-CM

## 2024-01-15 DIAGNOSIS — G89.29 CHRONIC MIDLINE THORACIC BACK PAIN: ICD-10-CM

## 2024-01-15 PROCEDURE — 72070 X-RAY EXAM THORAC SPINE 2VWS: CPT

## 2024-01-22 ENCOUNTER — TELEPHONE (OUTPATIENT)
Facility: CLINIC | Age: 39
End: 2024-01-22

## 2024-01-22 ASSESSMENT — ENCOUNTER SYMPTOMS: SHORTNESS OF BREATH: 0

## 2024-01-22 NOTE — PROGRESS NOTES
Chief Complaint   Patient presents with    Chronic midline thoracic back pain    Tennille's disease      Scheduled for a Cortrosyn test next month    Herpes Zoster    Discuss Labs     1/18/24     Assessment & Plan:     1. Chronic midline thoracic back pain  Assessment & Plan:  Discussed results of xray  Advised pt to follow up with physical therapy and ortho for management  2. Tennille's disease (HCC)  Assessment & Plan:  Continue management per endocrinology   3. Herpes zoster with complication  Assessment & Plan:  Continue management per endocrinology  Advised pt to continue to research allergists, immunologists, and specialists, and will order referral     Follow-up and Dispositions    Return in 5 weeks (on 2/27/2024) for back pain, wilfredo's disease, shingles, lab results.       Subjective:     HPI    Chronic back pain  Symptoms: states she still has the back pain  Sees chiropractor every month  Followed by ortho: none  Followed by physical therapy: Has evaluation with PT on 2/7    Wilfredo's disease  Followed by endocrinology: yes  Completed labs in endocrinology clinic  States her cortisol was high ,and needs to go another test for cortisol    Shingles  Symptoms: had shingles recently, currently has shingles  Called dermatologist to see if they could do the testing  Followed by endocrinology: yes, Dr. Garcia  States they did not mention a whole lot, ordered cortisol test  Has appointment for follow up in April    Review of Systems   Respiratory:  Negative for shortness of breath.    Cardiovascular:  Negative for chest pain and leg swelling.   Neurological:  Negative for dizziness, light-headedness and headaches.     Objective:     Vitals:    01/23/24 1539   BP: 97/64   Site: Left Upper Arm   Position: Sitting   Pulse: 83   Resp: 17   Temp: 98 °F (36.7 °C)   TempSrc: Temporal   SpO2: 98%   Weight: 46.7 kg (103 lb)   Height: 1.638 m (5' 4.5\")     Physical Exam  Vitals and nursing note reviewed.   Constitutional:

## 2024-01-22 NOTE — TELEPHONE ENCOUNTER
Pt called in reference to appt tomorrow, No answer. LVM to return phone call to reschedule appt in 30 min slot.

## 2024-01-22 NOTE — ASSESSMENT & PLAN NOTE
Continue management per endocrinology  Advised pt to continue to research allergists, immunologists, and specialists, and will order referral

## 2024-01-23 ENCOUNTER — OFFICE VISIT (OUTPATIENT)
Facility: CLINIC | Age: 39
End: 2024-01-23
Payer: COMMERCIAL

## 2024-01-23 VITALS
RESPIRATION RATE: 17 BRPM | OXYGEN SATURATION: 98 % | SYSTOLIC BLOOD PRESSURE: 97 MMHG | HEIGHT: 65 IN | DIASTOLIC BLOOD PRESSURE: 64 MMHG | TEMPERATURE: 98 F | HEART RATE: 83 BPM | BODY MASS INDEX: 17.16 KG/M2 | WEIGHT: 103 LBS

## 2024-01-23 DIAGNOSIS — G89.29 CHRONIC MIDLINE THORACIC BACK PAIN: Primary | ICD-10-CM

## 2024-01-23 DIAGNOSIS — B02.8 HERPES ZOSTER WITH COMPLICATION: ICD-10-CM

## 2024-01-23 DIAGNOSIS — E27.1 ADDISON'S DISEASE (HCC): ICD-10-CM

## 2024-01-23 DIAGNOSIS — M54.6 CHRONIC MIDLINE THORACIC BACK PAIN: Primary | ICD-10-CM

## 2024-01-23 PROCEDURE — 99213 OFFICE O/P EST LOW 20 MIN: CPT

## 2024-01-23 ASSESSMENT — PATIENT HEALTH QUESTIONNAIRE - PHQ9
SUM OF ALL RESPONSES TO PHQ QUESTIONS 1-9: 0
1. LITTLE INTEREST OR PLEASURE IN DOING THINGS: 0
SUM OF ALL RESPONSES TO PHQ QUESTIONS 1-9: 0
SUM OF ALL RESPONSES TO PHQ QUESTIONS 1-9: 0
2. FEELING DOWN, DEPRESSED OR HOPELESS: 0
SUM OF ALL RESPONSES TO PHQ9 QUESTIONS 1 & 2: 0
SUM OF ALL RESPONSES TO PHQ QUESTIONS 1-9: 0

## 2024-01-23 NOTE — PROGRESS NOTES
Dori Nino presents today for   Chief Complaint   Patient presents with    Chronic midline thoracic back pain    Wilfredo's disease      Scheduled for a Cortrosyn test next month    Herpes Zoster    Discuss Labs     24       Is someone accompanying this pt? no    Is the patient using any DME equipment during [facility]? no    Depression Screenin/23/2024     3:45 PM 2023     1:46 PM 2023     3:30 PM 2023     2:47 PM 3/15/2023     3:40 PM   PHQ-9 Questionaire   Little interest or pleasure in doing things 0 0 0 0 0   Feeling down, depressed, or hopeless 0 0 0 0 0   PHQ-9 Total Score 0 0 0 0 0        ANN 7-Anxiety       2023     9:44 AM 3/15/2023     3:41 PM   ANN-7 SCREENING   Feeling nervous, anxious, or on edge Not at all Several days   Not being able to stop or control worrying Not at all Several days   Worrying too much about different things Not at all Several days   Trouble relaxing Not at all Several days   Being so restless that it is hard to sit still Not at all Several days   Becoming easily annoyed or irritable Not at all Several days   Feeling afraid as if something awful might happen Not at all Not at all   ANN-7 Total Score 0 6   How difficult have these problems made it for you to do your work, take care of things at home, or get along with other people? Not difficult at all Somewhat difficult        Learning Assessment:  Who is the primary learner? Patient    What is the preferred language for health care of the primary learner? ENGLISH    How does the primary learner prefer to learn new concepts? DEMONSTRATION    Answered By Patient    Relationship to Learner SELF         Fall Risk       No data to display                   Travel Screening:    Travel Screening     No screening recorded since 24 0000       Travel History   Travel since 23    No documented travel since 23          Health Maintenance reviewed and discussed and ordered per Provider.  Social

## 2024-01-23 NOTE — PATIENT INSTRUCTIONS
[facility]  [facility] [address]  [place] [address] [phone]

## 2024-01-26 ENCOUNTER — TELEPHONE (OUTPATIENT)
Facility: CLINIC | Age: 39
End: 2024-01-26

## 2024-01-26 DIAGNOSIS — E55.9 VITAMIN D DEFICIENCY: Primary | ICD-10-CM

## 2024-01-26 RX ORDER — ERGOCALCIFEROL 1.25 MG/1
50000 CAPSULE ORAL WEEKLY
Qty: 12 CAPSULE | Refills: 0 | Status: SHIPPED | OUTPATIENT
Start: 2024-01-26

## 2024-01-26 NOTE — TELEPHONE ENCOUNTER
Please advise pt, labs are stable. Labs show antibodies for shingles, and HSV-2. Vitamin D is low. I ordered vitamin D 50,000 units weekly for 12 weeks. Follow up with endocrinology. Thank you!

## 2024-02-07 ENCOUNTER — HOSPITAL ENCOUNTER (OUTPATIENT)
Facility: HOSPITAL | Age: 39
Setting detail: RECURRING SERIES
Discharge: HOME | End: 2024-02-10
Payer: COMMERCIAL

## 2024-02-07 PROCEDURE — 97161 PT EVAL LOW COMPLEX 20 MIN: CPT

## 2024-02-07 NOTE — THERAPY EVALUATION
[facility]  [address] [facility] [address] Ph: [phone] Fx: [phone]  Plan of Care / Statement of Necessity for Physical Therapy Services     Patient Name: Dori Nino : 1985   Medical   Diagnosis: Thoracic back pain [M54.6] Treatment Diagnosis:   M54.6  THORACIC PAIN    Onset Date: 2023     Referral Source: Aishwarya Arce NP-C Start of Care (SOC): 2024   Prior Hospitalization: See medical history Provider #: [id number]   Prior Level of Function: Baseline pain with ability to complete dishes and vacuuming without rest break    Comorbidities: Anxiety or Panic Disorders, Back pain, Headaches, Sleep dysfunction     Assessment / peng information:  Ms. Nino is a 38 y.o. F who presents to clinic with orders for thoracic spine pain. She notes ongoing hx of thoracic spine pain for years, since diagnosed with scoliosis when she was 7 years old. She notes that she has had progressive worsening in symptoms within the last year. 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 She does note limited tolerance for prolonged sitting and standing tasks which limits her ability to complete dishes and vacuuming without rest break d/t pain. She has positive L thoracic scoliosis with hypermobility throughout cervical and lumbar spine and hypomobility throughout thoracic spine. She has tenderness with palpation along thoracic paraspinals and tenderness at T3-6 spinous process. She presents with core, UE, and hip girdle weakness throughout.     Patient with signs and symptoms consistent with thoracic spine pain related to scoliosis.  Patient will benefit from a program of skilled physical therapy to include therapeutic exercises to address strength deficits, therapeutic activities to improve functional mobility, neuromuscular reeducation to address balance, coordination and proprioception, manual therapy to 
Hypermobility                                                                                                     UE Strength:                                                       L (1-5) R (1-5) Pain   Flexors 4+ 4+ [] Yes   [] No   Abductors 4+ 4+ [] Yes   [] No   External Rotators 4+ 4+ [] Yes   [] No   Internal Rotators 4+ 4+ [] Yes   [] No   Middle trap  4 4 [] Yes   [] No   Lower Trapezius 4- 4- [] Yes   [] No   Elbow Flexion 5 5 [] Yes   [] No   Elbow Extension 5 5 [] Yes   [] No     LE Strength    L(0-5) R (0-5) N/T   Hip Flexion (L1,2) 4+ 4+ []   Knee Extension (L3,4) 5 5 []   Ankle Dorsiflexion (L4) 5 5 []   Great Toe Extension (L5)   [x]   Ankle Plantarflexion (S1) 5 5 []   Knee Flexion (S1,2) 5 5 []   Upper Abdominals 3 3 []   Lower Abdominals   [x]   Paraspinals   [x]   Back Rotators 3 3 []   Gluteus Floyd 4+ 4+ []   Other   []     Special Tests  Lumbar:  Lumb. Compression: [] Pos  [x] Neg               Lumbar Distraction:   [] Pos  [x] Neg    Quadrant:  [] Pos  [x] Neg   [] Flex  [] Ext    Sacroilliac:  Gaenslen's: [] R    [] L    [] +    [x] -     Compression: [] +    [x] -     Gapping:  [] +    [x] -     Thigh Thrust: [] R    [] L    [] +    [x] -     Leg Length: [] +    [x] -   Position: standing and supine           Hip: Navid:  [x] R    [x] L    [] +    [x] -     Scour:  [x] R    [x] L    [] +    [x] -     Piriformis: [x] R    [x] L    [] +    [x] -          Deficits: Scarlet's:  [x] R    [x] L    [] +    [x] -     William: [x] R    [x] L    [] +    [x] -     Hamstrings 90/90: tight bilaterally     Gastrocsoleus (to neutral): Right: Left:       Global Muscular Weakness:  Abdominals:  Quadratus Lumborum:  Paraspinals:  Other:    Other tests/comments:       ASSESSMENT/Changes in Function: Ms. Nino is a 38 y.o. F who presents to clinic with orders for thoracic spine pain. She notes ongoing hx of thoracic spine pain for years, since diagnosed with scoliosis when she was 7 years old. She notes that

## 2024-02-08 ENCOUNTER — TELEPHONE (OUTPATIENT)
Facility: CLINIC | Age: 39
End: 2024-02-08

## 2024-02-08 NOTE — TELEPHONE ENCOUNTER
Patient states that when she takes vit D she has increased irritability  muscle aches and spams along with Jaw Pain . Pt states her vit d was very low at endo .  Please let me know if pt maybe needs lower dose of vit d ?

## 2024-02-08 NOTE — TELEPHONE ENCOUNTER
Pt called in stating that she had allergic reaction to to the vitamin D (ERGOCALCIFEROL) 1.25 MG (15955 UT) CAPS capsule [[id number]]    And she wants a call back ASAP

## 2024-02-09 ENCOUNTER — TELEPHONE (OUTPATIENT)
Facility: CLINIC | Age: 39
End: 2024-02-09

## 2024-02-09 NOTE — TELEPHONE ENCOUNTER
----- Message from Kiara Rafal sent at 2/5/2024  1:08 PM EST -----  Subject: Medication Problem     Medication: vitamin D (ERGOCALCIFEROL) 1.25 MG (55644 UT) CAPS capsule  Dosage: one a week  Ordering Provider: Aishwarya Arce    Question/Problem: having a reaction of extreme irrability and mood swings       Pharmacy: [id number] - [place], [place] - [address] - P   [phone] - F [phone]    ---------------------------------------------------------------------------  --------------  CALL BACK INFO  [phone]; OK to leave message on voicemail  ---------------------------------------------------------------------------  --------------    SCRIPT ANSWERS  Relationship to Patient: Self

## 2024-02-22 ENCOUNTER — TELEPHONE (OUTPATIENT)
Facility: HOSPITAL | Age: 39
End: 2024-02-22

## 2024-02-22 NOTE — TELEPHONE ENCOUNTER
Called patient to schedule follow up visits. Patient's insurance approved 15 visits. No answer, LVM.

## 2024-02-27 ENCOUNTER — TELEPHONE (OUTPATIENT)
Facility: HOSPITAL | Age: 39
End: 2024-02-27

## 2024-02-27 NOTE — TELEPHONE ENCOUNTER
Called pt to schedule PT sessions. No answer, LVM and reminded pt of clinics 30 Day Policy. She will need to be seen on or before 2/28/2024.

## 2024-03-05 ENCOUNTER — TELEPHONE (OUTPATIENT)
Facility: HOSPITAL | Age: 39
End: 2024-03-05

## 2024-03-06 ENCOUNTER — HOSPITAL ENCOUNTER (OUTPATIENT)
Facility: HOSPITAL | Age: 39
Setting detail: RECURRING SERIES
Discharge: HOME | End: 2024-03-09
Payer: COMMERCIAL

## 2024-03-06 PROCEDURE — 97530 THERAPEUTIC ACTIVITIES: CPT

## 2024-03-06 PROCEDURE — 97110 THERAPEUTIC EXERCISES: CPT

## 2024-03-06 PROCEDURE — 97535 SELF CARE MNGMENT TRAINING: CPT

## 2024-03-06 PROCEDURE — 97112 NEUROMUSCULAR REEDUCATION: CPT

## 2024-03-06 NOTE — PROGRESS NOTES
[facility]  [address] [address] Ph: [phone] Fx: [phone]  PHYSICAL THERAPY PROGRESS NOTE  Patient Name: Dori Nino : 1985   Treatment/Medical Diagnosis: Thoracic back pain [M54.6]   Referral Source: Aishwarya Arce NP-C     Date of Initial Visit: 2024 Attended Visits: 2 Missed Visits: 0     SUMMARY OF TREATMENT  Ms. Nion has only been to two therapy session to date including today's session and initial evaluation which was about a month ago due in part to delay with insurance authorization and patient's limited schedule therefore there has been minimal to no progress made to date with regards to short term and long term goals. She would likely benefit from continued PT services focusing on consistent therapy schedule and HEP compliance in order to see any notable symptom improvement to maximize functional mobility.     CURRENT STATUS  1.  Patient will become proficient in their HEP and will be compliant in performing that program.  Evaluation:  issued HEP today  Current: Non compliant with program. Doing 1-3 exercises. No change. 3/6/24   2. Patient's pain level will be 1-2/10 with activity in order to improve patient's ability to perform normal ADLs.  Evaluation:  Current pain 4-5/10  Current: Pain still 4-5/10. Max is 6-7/10. No change. 3/6/24.   2. Patient will demonstrate AROM Thoracic Spine Flexion and Extension by 20%  to increase ease of ADLs.  Evaluation:  Thoracic Spine Flexion 80% and Extension 25% AROM  Current: Thoracic Spine Flexion 90%, Extension 40% AROM. 3/6/24.  3. Patient will increase FOTO score to  57 to indicate increased functional mobility.  Evaluation:  52  Current: FOTO= 50. Regressed by 2 points. 3/6/24.  4. Patient will tolerate sitting for 30 minutes; standing tolerance to 30 minutes to ease tolerance with ADLs such as doing dishes and vacuuming  Evaluation:  Currently limited to 15 minutes

## 2024-03-06 NOTE — PROGRESS NOTES
PHYSICAL / OCCUPATIONAL THERAPY - DAILY TREATMENT NOTE    Patient Name: Dori Nino    Date: 3/6/2024    : 1985  Insurance: Payor: [employer] [facility] [facility] [place] / Plan: [employer] [facility] / Product Type: *No Product type* /      Patient  verified Yes     Visit #   Current / Total 2 16   Time   In / Out 5:00 5:34   Pain   In / Out 4-5 3-4   Subjective Functional Status/Changes: Patient states her spine has been better some what but has not been compliant with HEP daily, she has done it a few times. Notes she has been doing Vit D supplements and feels she is getting more energy to do things.  Jury duty starts on the , not sure if I will get pulled or not.      TREATMENT AREA =  Thoracic back pain [M54.6]    OBJECTIVE      Therapeutic Procedures:    12794 Therapeutic Exercise (timed):  increase ROM, strength, coordination, balance, and proprioception to improve patient's ability to progress to PLOF and address remaining functional goals.   Tx Min Billable or 1:1 Min   (if diff from Tx Min) Details:   8  See flow sheet as applicable     59438 Neuromuscular Re-Education (timed):  improve balance, coordination, kinesthetic sense, posture, core stability and proprioception to improve patient's ability to develop conscious control of individual muscles and awareness of position of extremities in order to progress to PLOF and address remaining functional goals.   Tx Min Billable or 1:1 Min   (if diff from Tx Min) Details:   8  See flow sheet as applicable     02066 Therapeutic Activity (timed):  use of dynamic activities replicating functional movements to increase ROM, strength, coordination, balance, and proprioception in order to improve patient's ability to progress to PLOF and address remaining functional goals.   Tx Min Billable or 1:1 Min   (if diff from Tx Min) Details:   8  See flow sheet as applicable       43395 Self Care/Home Management (timed):  improve patient knowledge and understanding of

## 2024-03-08 ENCOUNTER — HOSPITAL ENCOUNTER (OUTPATIENT)
Facility: HOSPITAL | Age: 39
Setting detail: RECURRING SERIES
Discharge: HOME | End: 2024-03-11
Payer: COMMERCIAL

## 2024-03-08 PROCEDURE — 97110 THERAPEUTIC EXERCISES: CPT

## 2024-03-08 PROCEDURE — 97530 THERAPEUTIC ACTIVITIES: CPT

## 2024-03-08 PROCEDURE — 97112 NEUROMUSCULAR REEDUCATION: CPT

## 2024-03-08 PROCEDURE — 97535 SELF CARE MNGMENT TRAINING: CPT

## 2024-03-08 NOTE — PROGRESS NOTES
57 to indicate increased functional mobility.  At PN: FOTO= 50. Regressed by 2 points. 3/6/24.  5. Patient will tolerate sitting for 30 minutes; standing tolerance to 30 minutes to ease tolerance with ADLs such as doing dishes and vacuuming  At PN: Unchanged still limited with 15 minutes for sitting and standing, dishes and vacuuming. 3/6/24.       Next PN/ RC due 4/6/24  Auth due 15th visit or 5/6/24    PLAN  - Continue Plan of Care    SIOBHAN SALMON, MAYCOL    3/8/2024    2:25 PM    Future Appointments   Date Time Provider Department Center   3/13/2024  5:00 PM [facility]   3/15/2024  3:40 PM [facility]   3/18/2024  5:00 PM [facility]

## 2024-03-13 ENCOUNTER — HOSPITAL ENCOUNTER (OUTPATIENT)
Facility: HOSPITAL | Age: 39
Setting detail: RECURRING SERIES
Discharge: HOME | End: 2024-03-16
Payer: COMMERCIAL

## 2024-03-13 PROCEDURE — 97112 NEUROMUSCULAR REEDUCATION: CPT

## 2024-03-13 PROCEDURE — 97535 SELF CARE MNGMENT TRAINING: CPT

## 2024-03-13 PROCEDURE — 97530 THERAPEUTIC ACTIVITIES: CPT

## 2024-03-13 PROCEDURE — 97110 THERAPEUTIC EXERCISES: CPT

## 2024-03-13 NOTE — PROGRESS NOTES
PHYSICAL / OCCUPATIONAL THERAPY - DAILY TREATMENT NOTE    Patient Name: Dori Nino    Date: 3/13/2024    : 1985  Insurance: Payor: [employer] [facility] [place] / Plan: AET BETTER [facility] [place] / Product Type: *No Product type* /      Patient  verified Yes     Visit #   Current / Total 2 12   Time   In / Out 4:56 5:35   Pain   In / Out 4 3-4   Subjective Functional Status/Changes: Patient states she saw the chiropractor last week and they realignment my neck but he notes it was not as bad as last time.      TREATMENT AREA =  Thoracic back pain [M54.6]    OBJECTIVE      Therapeutic Procedures:    18646 Therapeutic Exercise (timed):  increase ROM, strength, coordination, balance, and proprioception to improve patient's ability to progress to PLOF and address remaining functional goals.   Tx Min Billable or 1:1 Min   (if diff from Tx Min) Details:    13  See flow sheet as applicable     46607 Neuromuscular Re-Education (timed):  improve balance, coordination, kinesthetic sense, posture, core stability and proprioception to improve patient's ability to develop conscious control of individual muscles and awareness of position of extremities in order to progress to PLOF and address remaining functional goals.   Tx Min Billable or 1:1 Min   (if diff from Tx Min) Details:   10  See flow sheet as applicable     95513 Therapeutic Activity (timed):  use of dynamic activities replicating functional movements to increase ROM, strength, coordination, balance, and proprioception in order to improve patient's ability to progress to PLOF and address remaining functional goals.   Tx Min Billable or 1:1 Min   (if diff from Tx Min) Details:   8  See flow sheet as applicable     93505 Self Care/Home Management (timed):  improve patient knowledge and understanding of pain reducing techniques, positioning, posture/ergonomics, home safety, activity modification, diagnosis/prognosis, and physical therapy expectations, procedures

## 2024-03-15 ENCOUNTER — HOSPITAL ENCOUNTER (OUTPATIENT)
Facility: HOSPITAL | Age: 39
Setting detail: RECURRING SERIES
Discharge: HOME | End: 2024-03-18
Payer: COMMERCIAL

## 2024-03-15 PROCEDURE — 97112 NEUROMUSCULAR REEDUCATION: CPT

## 2024-03-15 PROCEDURE — 97535 SELF CARE MNGMENT TRAINING: CPT

## 2024-03-15 PROCEDURE — 97530 THERAPEUTIC ACTIVITIES: CPT

## 2024-03-15 PROCEDURE — 97110 THERAPEUTIC EXERCISES: CPT

## 2024-03-15 NOTE — PROGRESS NOTES
PHYSICAL / OCCUPATIONAL THERAPY - DAILY TREATMENT NOTE    Patient Name: Dori Nino    Date: 3/15/2024    : 1985  Insurance: Payor: [facility] [place] / Plan[place] [employer] [place] / Product Type: *No Product type* /      Patient  verified Yes     Visit #   Current / Total 3 12   Time   In / Out 349 424   Pain   In / Out 3-4 4 \"sore\"    Subjective Functional Status/Changes: \"I got caught by a train\". Patient has jury duty on the  and is unsure if she can schedule more appointments past this date.      TREATMENT AREA =  Thoracic back pain [M54.6]    OBJECTIVE      Therapeutic Procedures:    83795 Therapeutic Exercise (timed):  increase ROM, strength, coordination, balance, and proprioception to improve patient's ability to progress to PLOF and address remaining functional goals.   Tx Min Billable or 1:1 Min   (if diff from Tx Min) Details:   11 11 See flow sheet as applicable     10598 Neuromuscular Re-Education (timed):  improve balance, coordination, kinesthetic sense, posture, core stability and proprioception to improve patient's ability to develop conscious control of individual muscles and awareness of position of extremities in order to progress to PLOF and address remaining functional goals.   Tx Min Billable or 1:1 Min   (if diff from Tx Min) Details:   8 8 See flow sheet as applicable     15272 Therapeutic Activity (timed):  use of dynamic activities replicating functional movements to increase ROM, strength, coordination, balance, and proprioception in order to improve patient's ability to progress to PLOF and address remaining functional goals.   Tx Min Billable or 1:1 Min   (if diff from Tx Min) Details:   8 8 See flow sheet as applicable     30199 Self Care/Home Management (timed):  improve patient knowledge and understanding of pain reducing techniques, positioning, posture/ergonomics, home safety, activity modification, diagnosis/prognosis, and physical therapy expectations,

## 2024-03-18 ENCOUNTER — HOSPITAL ENCOUNTER (OUTPATIENT)
Facility: HOSPITAL | Age: 39
Setting detail: RECURRING SERIES
Discharge: HOME | End: 2024-03-21
Payer: COMMERCIAL

## 2024-03-18 ENCOUNTER — TELEPHONE (OUTPATIENT)
Facility: HOSPITAL | Age: 39
End: 2024-03-18

## 2024-03-18 PROCEDURE — 97530 THERAPEUTIC ACTIVITIES: CPT

## 2024-03-18 PROCEDURE — 97112 NEUROMUSCULAR REEDUCATION: CPT

## 2024-03-18 PROCEDURE — 97110 THERAPEUTIC EXERCISES: CPT

## 2024-03-18 PROCEDURE — 97535 SELF CARE MNGMENT TRAINING: CPT

## 2024-03-18 NOTE — PROGRESS NOTES
and Extension by 20%  to increase ease of ADLs.  At PN: Thoracic Spine Flexion 90%, Extension 40% AROM. 3/6/24.     4. Patient will increase FOTO score to  57 to indicate increased functional mobility.  At PN: FOTO= 50. Regressed by 2 points. 3/6/24.     5. Patient will tolerate sitting for 30 minutes; standing tolerance to 30 minutes to ease tolerance with ADLs such as doing dishes and vacuuming  At PN: Unchanged still limited with 15 minutes for sitting and standing, dishes and vacuuming. 3/6/24.   Current: progressing, 20-30 minute sitting tolerance, patient states that she believes that she can tolerance standing for up to 4 hours currently 3/15/24     Next PN/ RC due 4/6/24  Auth due 15th visit or 5/6/24       PLAN  - Continue Plan of Care  - Other : Patient is waiting to schedule due to possible of having jury duty.     TEJINDER SALMON PTA    3/18/2024    4:51 PM    Future Appointments   Date Time Provider Department Center   3/18/2024  5:00 PM Tejinder Salmon PTA [facility]

## 2024-03-25 ENCOUNTER — HOSPITAL ENCOUNTER (OUTPATIENT)
Facility: HOSPITAL | Age: 39
Setting detail: RECURRING SERIES
Discharge: HOME | End: 2024-03-28
Payer: COMMERCIAL

## 2024-03-25 PROCEDURE — 97530 THERAPEUTIC ACTIVITIES: CPT

## 2024-03-25 PROCEDURE — 97112 NEUROMUSCULAR REEDUCATION: CPT

## 2024-03-25 PROCEDURE — 97535 SELF CARE MNGMENT TRAINING: CPT

## 2024-03-25 PROCEDURE — 97110 THERAPEUTIC EXERCISES: CPT

## 2024-03-25 NOTE — PROGRESS NOTES
PHYSICAL / OCCUPATIONAL THERAPY - DAILY TREATMENT NOTE    Patient Name: Dori Nino    Date: 3/25/2024    : 1985  Insurance: Payor: [facility] [place] / Plan: AET BETTER [facility] [place] / Product Type: *No Product type* /      Patient  verified Yes     Visit #   Current / Total 5 12   Time   In / Out 222 303   Pain   In / Out 4-5 4   Subjective Functional Status/Changes: Patient states she feels the cold weather aggravates her symptoms.     TREATMENT AREA =  Thoracic back pain [M54.6]    OBJECTIVE    Therapeutic Procedures:    44339 Therapeutic Exercise (timed):  increase ROM, strength, coordination, balance, and proprioception to improve patient's ability to progress to PLOF and address remaining functional goals.   Tx Min Billable or 1:1 Min   (if diff from Tx Min) Details:    12   See flow sheet as applicable      18185 Neuromuscular Re-Education (timed):  improve balance, coordination, kinesthetic sense, posture, core stability and proprioception to improve patient's ability to develop conscious control of individual muscles and awareness of position of extremities in order to progress to PLOF and address remaining functional goals.   Tx Min Billable or 1:1 Min   (if diff from Tx Min) Details:   11   See flow sheet as applicable      40366 Therapeutic Activity (timed):  use of dynamic activities replicating functional movements to increase ROM, strength, coordination, balance, and proprioception in order to improve patient's ability to progress to PLOF and address remaining functional goals.   Tx Min Billable or 1:1 Min   (if diff from Tx Min) Details:   10   See flow sheet as applicable      26619 Self Care/Home Management (timed):  improve patient knowledge and understanding of pain reducing techniques, positioning, posture/ergonomics, home safety, activity modification, diagnosis/prognosis, and physical therapy expectations, procedures and progression  to improve patient's ability to

## 2024-03-27 ENCOUNTER — HOSPITAL ENCOUNTER (OUTPATIENT)
Facility: HOSPITAL | Age: 39
Setting detail: RECURRING SERIES
Discharge: HOME | End: 2024-03-30
Payer: COMMERCIAL

## 2024-03-27 PROCEDURE — 97110 THERAPEUTIC EXERCISES: CPT

## 2024-03-27 PROCEDURE — 97530 THERAPEUTIC ACTIVITIES: CPT

## 2024-03-27 PROCEDURE — 97112 NEUROMUSCULAR REEDUCATION: CPT

## 2024-03-27 PROCEDURE — 97535 SELF CARE MNGMENT TRAINING: CPT

## 2024-03-27 NOTE — PROGRESS NOTES
PHYSICAL / OCCUPATIONAL THERAPY - DAILY TREATMENT NOTE    Patient Name: Dori Nino    Date: 3/27/2024    : 1985  Insurance: [place]or[place] [facility] [place] / Plan: AET BETTER [facility] [place] / Product Type: *No Product type* /      Patient  verified Yes     Visit #   Current / Total 6 12   Time   In / Out 227 324   Pain   In / Out 5 4   Subjective Functional Status/Changes: Patient states that after last session she started having left side jaw pain and tension headache.      TREATMENT AREA =  Thoracic back pain [M54.6]    OBJECTIVE    Heat (UNBILLED):  location/position: neck and back  .    Min Rationale   10 decrease pain to improve patient's ability to progress to PLOF and address remaining functional goals.     Skin assessment post-treatment:   Intact       Therapeutic Procedures:    03775 Therapeutic Exercise (timed):  increase ROM, strength, coordination, balance, and proprioception to improve patient's ability to progress to PLOF and address remaining functional goals.   Tx Min Billable or 1:1 Min   (if diff from Tx Min) Details:    19   See flow sheet as applicable      60069 Neuromuscular Re-Education (timed):  improve balance, coordination, kinesthetic sense, posture, core stability and proprioception to improve patient's ability to develop conscious control of individual muscles and awareness of position of extremities in order to progress to PLOF and address remaining functional goals.   Tx Min Billable or 1:1 Min   (if diff from Tx Min) Details:   10   See flow sheet as applicable      69287 Therapeutic Activity (timed):  use of dynamic activities replicating functional movements to increase ROM, strength, coordination, balance, and proprioception in order to improve patient's ability to progress to PLOF and address remaining functional goals.   Tx Min Billable or 1:1 Min   (if diff from Tx Min) Details:   10   See flow sheet as applicable      50942 Self Care/Home Management (timed):

## 2024-04-01 ENCOUNTER — HOSPITAL ENCOUNTER (OUTPATIENT)
Facility: HOSPITAL | Age: 39
Setting detail: RECURRING SERIES
Discharge: HOME | End: 2024-04-04
Payer: COMMERCIAL

## 2024-04-01 PROCEDURE — 97110 THERAPEUTIC EXERCISES: CPT

## 2024-04-01 PROCEDURE — 97112 NEUROMUSCULAR REEDUCATION: CPT

## 2024-04-01 PROCEDURE — 97535 SELF CARE MNGMENT TRAINING: CPT

## 2024-04-01 PROCEDURE — 97530 THERAPEUTIC ACTIVITIES: CPT

## 2024-04-01 NOTE — PROGRESS NOTES
PHYSICAL / OCCUPATIONAL THERAPY - DAILY TREATMENT NOTE    Patient Name: Dori Nino    Date: 2024    : 1985  Insurance: Payor: LEV [facility] [place] / Plan: AET BETTER [facility] [place] / Product Type: *No Product type* /      Patient  verified Yes     Visit #   Current / Total 7 12   Time   In / Out 341 439   Pain   In / Out 7 4   Subjective Functional Status/Changes: Patient states she had horrible headachs and neck pain.      TREATMENT AREA =  Thoracic back pain [M54.6]    OBJECTIVE    Heat (UNBILLED):  location/position: neck and back  .    Min Rationale   10 decrease pain to improve patient's ability to progress to PLOF and address remaining functional goals.     Skin assessment post-treatment:   Intact        Therapeutic Procedures:    52682 Therapeutic Exercise (timed):  increase ROM, strength, coordination, balance, and proprioception to improve patient's ability to progress to PLOF and address remaining functional goals.   Tx Min Billable or 1:1 Min   (if diff from Tx Min) Details:   20   See flow sheet as applicable      30254 Neuromuscular Re-Education (timed):  improve balance, coordination, kinesthetic sense, posture, core stability and proprioception to improve patient's ability to develop conscious control of individual muscles and awareness of position of extremities in order to progress to PLOF and address remaining functional goals.   Tx Min Billable or 1:1 Min   (if diff from Tx Min) Details:   10   See flow sheet as applicable      13958 Therapeutic Activity (timed):  use of dynamic activities replicating functional movements to increase ROM, strength, coordination, balance, and proprioception in order to improve patient's ability to progress to PLOF and address remaining functional goals.   Tx Min Billable or 1:1 Min   (if diff from Tx Min) Details:   10   See flow sheet as applicable      42044 Self Care/Home Management (timed):  improve patient knowledge and understanding of

## 2024-04-03 ENCOUNTER — HOSPITAL ENCOUNTER (OUTPATIENT)
Facility: HOSPITAL | Age: 39
Setting detail: RECURRING SERIES
Discharge: HOME | End: 2024-04-06
Payer: COMMERCIAL

## 2024-04-03 PROCEDURE — 97110 THERAPEUTIC EXERCISES: CPT

## 2024-04-03 PROCEDURE — 97112 NEUROMUSCULAR REEDUCATION: CPT

## 2024-04-03 PROCEDURE — 97530 THERAPEUTIC ACTIVITIES: CPT

## 2024-04-03 PROCEDURE — 97535 SELF CARE MNGMENT TRAINING: CPT

## 2024-04-03 NOTE — PROGRESS NOTES
[facility]  [address] [address] Ph: [phone] Fx: [phone]  PHYSICAL THERAPY PROGRESS NOTE  Patient Name: Dori Nino : 1985   Treatment/Medical Diagnosis: Thoracic back pain [M54.6]   Referral Source: Aishwarya Arce, NPAidaC     Date of Initial Visit: 2024 Attended Visits: 10 Missed Visits: 0     SUMMARY OF TREATMENT  Patient has attended a total of 10 visits for treatment of thoracic spine pain with progression towards STG and LTGs. She continues to note ongoing limitations with prolonged tasks such as vacuuming and doing dishes. Continue to benefit from skilled PT intervention to maximize functional spinal mobility and stability to improve QoL.     CURRENT STATUS  1.  Patient will become proficient in their HEP and will be compliant in performing that program.  At PN: Non compliant with program. Doing 1-3 exercises. No change. 3/6/24   Current: Progressing: patient is performing HEP, almost daily.      2. Patient's pain level will be 1-2/10 with activity in order to improve patient's ability to perform normal ADLs.  At PN: Pain still 4-5/10. Max is 6-7/10. No change. 3/6/24.   Current: Remains at a 3-4/10 with max 8/10. No change. 4/3/24.     3. Patient will demonstrate AROM Thoracic Spine Flexion and Extension by 20%  to increase ease of ADLs.  At PN: Thoracic Spine Flexion 90%, Extension 40% AROM. 3/6/24.  Current: remains; Thoracic Spine Flexion limited by 10% (AROM flexion 90%), Extension limited by 25% (AROM extension 40%). 3/25/24     4. Patient will increase FOTO score to  57 to indicate increased functional mobility.  At PN: FOTO= 50. Regressed by 2 points. 3/6/24.  Current: FOTO = 49. Regressed 1 point. 4/3/24.     5. Patient will tolerate sitting for 30 minutes; standing tolerance to 30 minutes to ease tolerance with ADLs such as doing dishes and vacuuming  At PN: Unchanged still limited with 15 minutes for 
and address remaining functional goals.   Tx Min Billable or 1:1 Min   (if diff from Tx Min) Details:   10   See flow sheet as applicable      88532 Self Care/Home Management (timed):  improve patient knowledge and understanding of pain reducing techniques, positioning, posture/ergonomics, home safety, activity modification, diagnosis/prognosis, and physical therapy expectations, procedures and progression  to improve patient's ability to progress to PLOF and address remaining functional goals.    Tx Min Billable or 1:1 Min   (if diff from Tx Min) Details:   8   See flow sheet as applicable; discussed prognosis and HEP         45  [facility] Totals Reminder: bill using total billable min of TIMED therapeutic procedures (example: do not include dry needle or estim unattended, both untimed codes, in totals to left)  8-22 min = 1 unit; 23-37 min = 2 units; 38-52 min = 3 units; 53-67 min = 4 units; 68-82 min = 5 units   Total Total          [x]  Patient Education billed concurrently with other procedures   [x] Review HEP    [] Progressed/Changed HEP, detail:    [] Other detail:       Objective Information/Functional Measures/Assessment    Thoracic Spine Flexion limited by 10% (AROM flexion 90%), Extension limited by 25% (AROM extension 40%)      Patient will continue to benefit from skilled PT / OT services to modify and progress therapeutic interventions, analyze and address functional mobility deficits, analyze and address ROM deficits, analyze and address strength deficits, analyze and address soft tissue restrictions, analyze and cue for proper movement patterns, analyze and modify for postural abnormalities, analyze and address imbalance/dizziness, and instruct in home and community integration to address functional deficits and attain remaining goals.    Progress toward goals / Updated goals:  []  See Progress Note/Recertification    New Goals to be achieved in __6_ WEEKS  1.  Patient will become proficient in their

## 2024-04-10 ENCOUNTER — HOSPITAL ENCOUNTER (OUTPATIENT)
Facility: HOSPITAL | Age: 39
Setting detail: RECURRING SERIES
Discharge: HOME | End: 2024-04-13
Payer: COMMERCIAL

## 2024-04-10 PROCEDURE — 97535 SELF CARE MNGMENT TRAINING: CPT

## 2024-04-10 PROCEDURE — 97112 NEUROMUSCULAR REEDUCATION: CPT

## 2024-04-10 PROCEDURE — 97530 THERAPEUTIC ACTIVITIES: CPT

## 2024-04-10 PROCEDURE — 97110 THERAPEUTIC EXERCISES: CPT

## 2024-04-10 NOTE — PROGRESS NOTES
goals.   Tx Min Billable or 1:1 Min   (if diff from Tx Min) Details:   8   See flow sheet as applicable      63409 Self Care/Home Management (timed):  improve patient knowledge and understanding of positioning, posture/ergonomics, and activity modification  to improve patient's ability to progress to PLOF and address remaining functional goals.    Tx Min Billable or 1:1 Min   (if diff from Tx Min) Details:   8  See flow sheet as applicable          42  [facility] Totals Reminder: bill using total billable min of TIMED therapeutic procedures (example: do not include dry needle or estim unattended, both untimed codes, in totals to left)  8-22 min = 1 unit; 23-37 min = 2 units; 38-52 min = 3 units; 53-67 min = 4 units; 68-82 min = 5 units   Total Total          [x]  Patient Education billed concurrently with other procedures   [x] Review HEP    [] Progressed/Changed HEP, detail:    [] Other detail:       Objective Information/Functional Measures/Assessment    Reintegrated resistance band exercises today with good tolerance, plant oc continue to focus on core and hip girdle stability to optimize postural alignment and support for spine for prolonged standing activities.    Patient will continue to benefit from skilled PT / OT services to modify and progress therapeutic interventions, analyze and address functional mobility deficits, analyze and address ROM deficits, analyze and address strength deficits, analyze and address soft tissue restrictions, analyze and cue for proper movement patterns, analyze and modify for postural abnormalities, analyze and address imbalance/dizziness, and instruct in home and community integration to address functional deficits and attain remaining goals.    Progress toward goals / Updated goals:  []  See Progress Note/Recertification    New Goals to be achieved in __4__ WEEKS  1.  Patient will become proficient in their HEP and will be compliant in performing that program.  At PN: Progressing:

## 2024-04-15 ENCOUNTER — HOSPITAL ENCOUNTER (OUTPATIENT)
Facility: HOSPITAL | Age: 39
Setting detail: RECURRING SERIES
Discharge: HOME | End: 2024-04-18
Payer: COMMERCIAL

## 2024-04-15 PROCEDURE — 97530 THERAPEUTIC ACTIVITIES: CPT

## 2024-04-15 PROCEDURE — 97112 NEUROMUSCULAR REEDUCATION: CPT

## 2024-04-15 PROCEDURE — 97535 SELF CARE MNGMENT TRAINING: CPT

## 2024-04-15 PROCEDURE — 97110 THERAPEUTIC EXERCISES: CPT

## 2024-04-15 NOTE — PROGRESS NOTES
Extension by 20%  to increase ease of ADLs.  At PN: remains; Thoracic Spine Flexion limited by 10% (AROM flexion 90%), Extension limited by 25% (AROM extension 40%). 3/25/24     4. Patient will increase FOTO score to  57 to indicate increased functional mobility.  At PN: FOTO = 49. Regressed 1 point. 4/3/24.     5. Patient will tolerate sitting for 30 minutes; standing tolerance to 30 minutes to ease tolerance with ADLs such as doing dishes and vacuuming  At PN: progressing, 20-30 minute sitting tolerance, patient states that she believes that she can tolerance standing for up to 4 hours currently, dishes and vacuuming still causing issues. 4/3/24.  Current: MET: sitting tolerance 40 mins; standing tolerance 30 mins. 4/15/24      Next PN/ RC due 5/3/24  Auth due 15th visit or 5/6/24    PLAN  - Continue Plan of Care    SIOBHAN SALMON, PTA    4/15/2024    4:27 PM    Future Appointments   Date Time Provider Department Center   4/17/2024  3:40 PM Shahnaz Pickering, PT [facility]   5/6/2024  3:00 PM Vinnie Gongora MD [facility]

## 2024-04-17 ENCOUNTER — HOSPITAL ENCOUNTER (OUTPATIENT)
Facility: HOSPITAL | Age: 39
Setting detail: RECURRING SERIES
Discharge: HOME | End: 2024-04-20
Payer: COMMERCIAL

## 2024-04-17 PROCEDURE — 97110 THERAPEUTIC EXERCISES: CPT

## 2024-04-17 PROCEDURE — 97535 SELF CARE MNGMENT TRAINING: CPT

## 2024-04-17 PROCEDURE — 97530 THERAPEUTIC ACTIVITIES: CPT

## 2024-04-17 PROCEDURE — 97112 NEUROMUSCULAR REEDUCATION: CPT

## 2024-04-17 NOTE — PROGRESS NOTES
Physical Therapy Discharge Instructions      [facility] - [address] [address] Ph: [phone] Fx: [phone]    Patient: Dori Nino  : 1985      Continue Home Exercise Program 1 times per day for 4 weeks, then decrease to 3-4 times per week      Continue with    [] Ice  as needed 1-2 times per day     [x] Heat           Follow up with MD:     [x] Upon completion of therapy     [x] As needed      Recommendations:     [x]   Return to activity with home program    []   Return to activity with the following modifications:       []Post Rehab Program    []Join Independent aquatic program     []Return to/join local gym      Additional Comments: issued finalized HEP with resistance bands of red and green      Shahnaz Pickering, PT 2024 4:17 PM

## 2024-04-17 NOTE — PROGRESS NOTES
[facility]  [address] [facility] [address] Ph: [phone] Fx: [phone]  DISCHARGE SUMMARY  Patient Name: Dori Nino : 1985   Treatment/Medical Diagnosis: Thoracic back pain [M54.6]   Referral Source: Aishwarya Arce, ANGELAC     Date of Initial Visit: 24 Attended Visits: 14 Missed Visits: 0     SUMMARY OF TREATMENT  Dori has attended a total of 14 visits and has been doing well as far as progression towards STG and LTG however has reached plateau with functional progress to date and is ready for transition to independent management for HEP.     CURRENT STATUS    New Goals to be achieved in __4__ WEEKS  1.  Patient will become proficient in their HEP and will be compliant in performing that program.  At PN: Progressing: patient is performing HEP, almost daily.   Current: daily compliance with HEP. 24. MET     2. Patient's pain level will be 1-2/10 with activity in order to improve patient's ability to perform normal ADLs.  At PN: Remains at a 3-4/10 with max 8/10. No change. 4/3/24.  Current: progressing, 3-4 max over the last few days 24.     3. Patient will demonstrate AROM Thoracic Spine Flexion and Extension by 20%  to increase ease of ADLs.  At PN: remains; Thoracic Spine Flexion limited by 10% (AROM flexion 90%), Extension limited by 25% (AROM extension 40%). 3/25/24  Current: Thoracic Spine Flexion limited by 10% (AROM flexion 90%), Extension limited by 25% (AROM extension 40%).no change. 24.     4. Patient will increase FOTO score to  57 to indicate increased functional mobility.  At PN: FOTO = 49. Regressed 1 point. 4/3/24.  Current: FOTO= 49. No change. 24.     5. Patient will tolerate sitting for 30 minutes; standing tolerance to 30 minutes to ease tolerance with ADLs such as doing dishes and vacuuming  At PN: progressing, 20-30 minute sitting tolerance, patient states that she believes that she can

## 2024-04-17 NOTE — PROGRESS NOTES
PHYSICAL / OCCUPATIONAL THERAPY - DAILY TREATMENT NOTE    Patient Name: Dori Nino    Date: 2024    : 1985  Insurance: Payor: LEV [facility] [place] / Plan: AET BETTER [facility] [place] / Product Type: *No Product type* /      Patient  verified Yes     Visit #   Current / Total 4 12   Time   In / Out 3:38 4:21   Pain   In / Out 3 2-3   Subjective Functional Status/Changes: Patient states she has been compliant with home program daily and that the hike was challenging.      TREATMENT AREA =  Thoracic back pain [M54.6]    OBJECTIVE    Therapeutic Procedures:    19155 Therapeutic Exercise (timed):  increase ROM, strength, coordination, balance, and proprioception to improve patient's ability to progress to PLOF and address remaining functional goals.   Tx Min Billable or 1:1 Min   (if diff from Tx Min) Details:   15  See flow sheet as applicable     11258 Neuromuscular Re-Education (timed):  improve balance, coordination, kinesthetic sense, posture, core stability and proprioception to improve patient's ability to develop conscious control of individual muscles and awareness of position of extremities in order to progress to PLOF and address remaining functional goals.   Tx Min Billable or 1:1 Min   (if diff from Tx Min) Details:   12  See flow sheet as applicable     99094 Therapeutic Activity (timed):  use of dynamic activities replicating functional movements to increase ROM, strength, coordination, balance, and proprioception in order to improve patient's ability to progress to PLOF and address remaining functional goals.   Tx Min Billable or 1:1 Min   (if diff from Tx Min) Details:   8  See flow sheet as applicable     15440 Self Care/Home Management (timed):  improve patient knowledge and understanding of pain reducing techniques, positioning, posture/ergonomics, and physical therapy expectations, procedures and progression  to improve patient's ability to progress to PLOF and address remaining  Patient : Zenaida Mosqueda Age: 69 year old Sex: female   MRN: [id number] Encounter Date: 12/7/2017  History     Chief Complaint   Patient presents with   • Low Blood Sugar (Symptomatic)     HPI     12/7/2017  11:17 AM Zenaida Mosqueda is a 69 year old female who presents to the ED via EMS for AMS. The patients caregiver called EMS for weakness. EMS found patient hypoglycemic at 38. Patient received D10 and blood glucose improved to 107. Patient is a diabetic and does not use insulin. The pt lives at home alone.     Per pt, she felt weak upon waking this morning at 3:00 AM. On her way back from the restroom, the patient had a fall.  She denies any warning sx and is unsure why she fell ( did not trip, no lightheadedness, dizziness, etc.) She called for EMS and they helped her from the ground. She reports her weakness has progressed since. Pt has been experiencing diarrhea over the past few days and notes one episode of vomiting yesterday after drinking soda. The patient also has chronic BLE swelling and foot pain. Pt denies recent fever, chills, cough, congestion, CP, SOB, abdominal pain, N/V/D. Pt has no urinary complaints. She has not yet eaten and suspects that caused her hypoglycemia. Patient last took her DM medications yesterday morning and missed breakfast today.    PCP: Ayanna Perry MD    Allergies   Allergen Reactions   • Contrast Media      Swelling and skin peeling   • Penicillins        Prior to Admission Medications    ASPIRIN 81 MG TABLET    Take 81 mg by mouth daily.    ATORVASTATIN (LIPITOR) 10 MG TABLET    Take 1 tablet by mouth nightly.    BISACODYL (DULCOLAX) 10 MG SUPPOSITORY    Place 1 suppository rectally daily as needed for Constipation.    CARVEDILOL (COREG) 12.5 MG TABLET    TAKE 1 TABLET BY MOUTH TWICE DAILY    CEPHALEXIN (KEFLEX) 500 MG CAPSULE    Take 1 capsule by mouth 2 times daily for 10 days.    CLOPIDOGREL (PLAVIX) 75 MG TABLET    Take 1 tablet by mouth daily.    CYANOCOBALAMIN (B-12) 500  MCG TAB    Take 1 tablet by mouth daily.    FOLIC ACID (FOLATE) 1 MG TABLET    Take 1 tablet by mouth daily.    FUROSEMIDE (LASIX) 40 MG TABLET    Take 40 mg by mouth 2 times daily. Indications: Edema    GABAPENTIN (NEURONTIN) 100 MG CAPSULE    Take 1 capsule by mouth daily.    GLIMEPIRIDE (AMARYL) 1 MG TABLET    Take 1 tablet by mouth daily.    LOSARTAN (COZAAR) 50 MG TABLET    Take 1 tablet by mouth daily.    OXYCODONE/APAP (PERCOCET) 5-325 MG PER TABLET    Take 1-2 tablets by mouth every 4 hours as needed for Pain.    POTASSIUM CHLORIDE (K-DUR,KLOR-CON) 20 MEQ CR TABLET    Take 0.5 tablets by mouth daily.    VITAMIN D, ERGOCALCIFEROL, 82529 UNITS CAPSULE    Take 1 capsule by mouth once a week.     Past Medical History:   Diagnosis Date   • Arthritis    • CAD (coronary artery disease)    • Cardiac failure congestive    • Cholelithiasis    • Crohn's disease (CMS/HCC)    • Diabetes mellitus (CMS/HCC)    • Difficulty initiating walking    • Fracture    • Glaucoma    • Heart failure (CMS/HCC) Dec 2011   • HTN (hypertension)    • Mitral valve disease    • Myocardial infarction    • Obesity    • Other and unspecified hyperlipidemia    • Polio     as a child   • Poliomyelitis    • RAD (reactive airway disease)    • Rash    • Renal artery stenosis (CMS/HCC)    • Shortness of breath        Past Surgical History:   Procedure Laterality Date   • CARDIAC SURGERY  09/26/2011    MATTEO, 5vessel   • CATARACT EXTRACTION W/  INTRAOCULAR LENS IMPLANT      left eye   • EYE SURGERY     • FEMUR SURGERY  11/13/14    RIGHT   • KNEE SURGERY     • MITRAL VALVE ANNULOPLASTY  09/26/2011    26 CE ring   • OOPHORECTOMY     • PTCA     • REMOVAL GALLBLADDER     • RENAL ANGIOPLASTY     • SERVICE TO GASTROENTEROLOGY     • SERVICE TO UROLOGY     • VASCULAR SURGERY       Family History   Problem Relation Age of Onset   • Heart Father    • Heart disease Father    • High blood pressure Father    • Diabetes Father    • High blood pressure Mother    •  Diabetes Mother    • Cancer Mother      breast cancer   • Stroke Mother        Social History   Substance Use Topics   • Smoking status: Former Smoker     Packs/day: 0.50     Years: 2.00     Types: Cigarettes     Quit date: 1/1/1990   • Smokeless tobacco: Never Used   • Alcohol use No     Review of Systems   Constitutional: Negative for chills and fever.        +fall, hypoglycemic    HENT: Negative for congestion.    Respiratory: Negative for cough and shortness of breath.    Cardiovascular: Negative for chest pain.   Gastrointestinal: Negative for abdominal pain, diarrhea and vomiting.   Genitourinary: Negative for difficulty urinating and dysuria.   Musculoskeletal: Negative for back pain.   Skin: Negative for rash.   Neurological: Positive for weakness. Negative for dizziness.   Psychiatric/Behavioral: Positive for confusion. Negative for behavioral problems.       Physical Exam     ED Triage Vitals   ED Triage Vitals Group      Temp 12/07/17 1115 97.7 °F (36.5 °C)      Pulse 12/07/17 1115 82      Resp 12/07/17 1115 16      BP 12/07/17 1115 151/69      SpO2 12/07/17 1115 99 %      EtCO2 mmHg --       Height 12/07/17 1500 5' 3\" (1.6 m)      Weight 12/07/17 1500 250 lb (113.4 kg)      Weight Scale Used 12/07/17 1500 Estimate     Physical Exam   Constitutional: She is oriented to person, place, and time. She appears well-developed.   HENT:   Head: Normocephalic and atraumatic.   Contusion under the left mandible.    Eyes: Pupils are equal, round, and reactive to light.   Neck: Normal range of motion.   Cardiovascular: Normal rate.    Pulmonary/Chest: Effort normal and breath sounds normal.   Abdominal: Soft. Bowel sounds are normal. She exhibits no distension. There is no tenderness.   Genitourinary:   Genitourinary Comments: Labia erythematous, consistent with yeast infection.   Musculoskeletal: Normal range of motion. She exhibits edema (chronic lymphedema of BLE, with what appears to be chronic cellulitis). She  exhibits no tenderness.   Neurological: She is alert and oriented to person, place, and time.   RUE contracted.    Skin: Skin is warm.   Skin breakdown on the plantar surface of proximal lateral R foot.    Psychiatric: She has a normal mood and affect.   Nursing note and vitals reviewed.     ED Course     Procedures    Lab Results     Results for orders placed or performed during the hospital encounter of 12/07/17   CBC & Auto Differential   Result Value Ref Range    WBC 13.6 (H) 4.2 - 11.0 K/mcL    RBC 3.75 (L) 4.00 - 5.20 mil/mcL    HGB 10.2 (L) 12.0 - 15.5 g/dL    HCT 32.4 (L) 36.0 - 46.5 %    MCV 86.4 78.0 - 100.0 fl    MCH 27.2 26.0 - 34.0 pg    MCHC 31.5 (L) 32.0 - 36.5 g/dL    RDW-CV 14.6 11.0 - 15.0 %     140 - 450 K/mcL    DIFF TYPE AUTOMATED DIFFERENTIAL     Neutrophil 92 %    LYMPH 3 %    MONO 5 %    EOSIN 0 %    BASO 0 %    Absolute Neutrophil 12.5 (H) 1.8 - 7.7 K/mcL    Absolute Lymph 0.4 (L) 1.0 - 4.0 K/mcL    Absolute Mono 0.7 0.3 - 0.9 K/mcL    Absolute Eos 0.0 (L) 0.1 - 0.5 K/mcL    Absolute Baso 0.0 0.0 - 0.3 K/mcL   B Type Natriuretic Peptide BNP   Result Value Ref Range    B-TYPE NATRIURETIC PEPTIDE 134 (H) <100 pg/mL   Basic Metabolic Panel   Result Value Ref Range    Sodium 135 135 - 145 mmol/L    Potassium 4.7 3.4 - 5.1 mmol/L    Chloride 105 98 - 107 mmol/L    Carbon Dioxide 17 (L) 21 - 32 mmol/L    Anion Gap 18 10 - 20 mmol/L    Glucose 117 (H) 65 - 99 mg/dL     (H) 6 - 20 mg/dL    Creatinine 4.33 (H) 0.51 - 0.95 mg/dL    GFR Estimate,  11     GFR Estimate, Non African American 10     BUN/Creatinine Ratio 23 7 - 25    CALCIUM 8.6 8.4 - 10.2 mg/dL   Urinalysis & Reflex Micro with Culture if Indicated   Result Value Ref Range    COLOR YELLOW YELLOW    APPEARANCE CLEAR     GLUCOSE(URINE) NEGATIVE NEGATIVE mg/dL    BILIRUBIN NEGATIVE NEGATIVE    KETONES NEGATIVE NEGATIVE mg/dL    SPECIFIC GRAVITY 1.015 1.005 - 1.030    BLOOD NEGATIVE NEGATIVE    pH 5.0 5.0 - 7.0  Units    PROTEIN(URINE) 30 (A) NEGATIVE mg/dL    UROBILINOGEN 0.2 0.0 - 1.0 mg/dL    NITRITE NEGATIVE NEGATIVE    LEUKOCYTE ESTERASE NEGATIVE NEGATIVE    SPECIMEN TYPE URINE, CLEAN CATCH/MIDSTREAM     Squamous EPI'S NONE SEEN 0 - 5 /hpf    RBC 1 to 3 0 - 3 /hpf    WBC 1 to 5 0 - 5 /hpf    BACTERIA NONE SEEN NONE SEEN /hpf    Hyaline Casts 1 to 5 0 - 5 /lpf   Troponin I - Point of Care   Result Value Ref Range    Troponin I POC <0.10 <0.10 ng/mL   Metered blood glucose   Result Value Ref Range    Glucose Bedside  (H) 65 - 99 mg/dL   Metered blood glucose   Result Value Ref Range    Glucose Bedside  (H) 65 - 99 mg/dL     EKG Results     EKG Interpretation  Rate: 83  Rhythm: normal sinus rhythm   Abnormality: 1st degree AV block. No significant change from 1/4/17.  EKG interpreted by ED physician    Radiology Results     Imaging Results          CT Head Brain (Final result)  Result time 12/07/17 13:53:50    Final result                 Impression:    IMPRESSION:  1. No acute intracranial findings.  2. Remote left MCA territory infarct.  3. Age-related volume loss and chronic microvascular ischemic disease.    I have reviewed the images and agree with the resident's interpretation.  There               Narrative:      CT HEAD WO CONTRAST    HISTORY: Head injury.     TECHNIQUE: 5 mm axial images were acquired through the brain without the  administration of intravenous contrast. Coronal and sagittal reformatted  images were generated for review.    COMPARISON: CT head 11/10/2015.    FINDINGS:  No acute intracranial hemorrhage. No abnormal extra-axial fluid collection.    No large vascular territory infarct of the right is more sensitive to acute  ischemia. Remote left MCA territory infarct, with expected encephalomalacia  and expected dilatation of the left lateral ventricle.    Sulcal and ventricular prominence suggest age-related volume loss.  Periventricular and deep white matter hypoattenuation, likely  sequela of  chronic thoracic ischemic disease.    No hydrocephalus, mass effect or midline shift. The posterior fossa and  brainstem are within normal limits. Densely calcified carotid siphons.    The calvarium is intact. The mastoid air cells are well aerated. Paranasal  sinuses are clear.                       Preliminary result                 Impression:    IMPRESSION:  1. No acute intracranial findings.  2. Remote left MCA territory infarct.  3. Age-related volume loss and chronic microvascular ischemic disease.    I have reviewed the images and agree with the resident's interpretation.               Narrative:      CT HEAD WO CONTRAST    HISTORY: Head injury.     TECHNIQUE: 5 mm axial images were acquired through the brain without the  administration of intravenous contrast. Coronal and sagittal reformatted  images were generated for review.    COMPARISON: CT head 11/10/2015.    FINDINGS:  No acute intracranial hemorrhage. No abnormal extra-axial fluid collection.    No large vascular territory infarct of the right is more sensitive to acute  ischemia. Remote left MCA territory infarct, with expected encephalomalacia  and expected dilatation of the left lateral ventricle.    Sulcal and ventricular prominence suggest age-related volume loss.  Periventricular and deep white matter hypoattenuation, likely sequela of  chronic thoracic ischemic disease.    No hydrocephalus, mass effect or midline shift. The posterior fossa and  brainstem are within normal limits. Densely calcified carotid siphons.    The calvarium is intact. The mastoid air cells are well aerated. Paranasal  sinuses are clear.                                  XR Chest AP or PA (Final result)  Result time 12/07/17 12:26:03    Final result                 Impression:    IMPRESSION:      No acute cardiopulmonary findings.    I have reviewed the images and agree with the Resident's interpretation.               Narrative:    XR CHEST AP OR  PA    HISTORY: Fall.    COMPARISON: Chest radiograph 4/28/2015.    TECHNIQUE: A single, AP, 60 degree upright view of the chest is submitted  for evaluation.    FINDINGS:    Median sternotomy, cardiac revascularization and mitral valve annuloplasty.    The cardiomediastinal silhouette is enlarged, stable. Pulmonary vasculature  is within normal limits. No focal consolidation. No pleural effusion or  pneumothorax. EKG leads overlie the chest. Unremarkable visualized osseous  structures.                    Preliminary result                 Impression:    IMPRESSION:      No acute cardiopulmonary findings.    I have reviewed the images and agree with the Resident's interpretation.               Narrative:    XR CHEST AP OR PA    HISTORY: Fall.    COMPARISON: Chest radiograph 4/28/2015.    TECHNIQUE: A single, AP, 60 degree upright view of the chest is submitted  for evaluation.    FINDINGS:    Median sternotomy, cardiac revascularization and mitral valve annuloplasty.    The cardiomediastinal silhouette is enlarged, stable. Pulmonary vasculature  is within normal limits. No focal consolidation. No pleural effusion or  pneumothorax. EKG leads overlie the chest. Unremarkable visualized osseous  structures.                                ED Medication Orders     Start Ordered     Status Ordering Provider    12/07/17 1329 12/07/17 1328  oxyCODONE/APAP (PERCOCET) 5-325 MG tablet 1 tablet  ONCE      Last MAR action:  Given ALANA SCOTT    12/07/17 1133 12/07/17 1132  sodium chloride (PF) 0.9 % injection 2 mL  (Capped IV)  ONCE      Acknowledged ALANA SCOTT    12/07/17 1130 12/07/17 1132  sodium chloride (PF) 0.9 % injection 2 mL  (Capped IV)  PRN      Acknowledged ALANA SCOTT          [facility]    Ongoing Vitals  Vitals:    12/07/17 1340 12/07/17 1400 12/07/17 1430 12/07/17 1500   BP: 117/58 114/57 103/70 117/53   Pulse: 91 90 91 91   Resp: 19 20 21 18   Temp:    97.5 °F (36.4 °C)   TempSrc:    Temporal Artery   SpO2: 98%  98% 97%    Weight:    113.4 kg   Height:    5' 3\" (1.6 m)     ED Course  Pt presented to the ED with hypoglycemia.  Pt stated she did not eat this am which likely caused her symptoms.  Pt got up early this am and fell on way back from bathroom.  She can not tell me exactly why she fell but denied cp, sob, dizziness, weakness, etc.  Pt takes her diabetic medication in the am and did not today.  Workup in the ED unremarkable and BP within normal while in the ED.  Pt felt comfortable being discharged home.  She is currently at wound care getting legs wrapped.  Upon return to the ED, if blood sugar within normal range, pt will be discharged home and given appropriate DM education.     1:36 PM Patient Recheck: Pt is resting in bed comfortably. RN reports patient had labial eyrthema while inserting cath. I examined the patient with RN in room. Labia is erythematous, consistent with yeast infection.    1:38 PM: Pt family in the room. We discussed her presentation and they report frequent falls. RN is going to speak with wound care regarding wound on RLE.     2:26 PM Patient Recheck: Pt is resting in bed comfortably. She reports feeling well other than leg pain from her unwrapped wound. Pt home nurse was there earlier today and left when the patient came in today. Her home RN was supposed to rewrap the wound. The patient feels comfortable going home as her sx have resolved. We discussed having her follow up with her PMD and she was instructed to return for any worsening sx. The pt understands and agrees with the treatment plan and has no further questions.    2:27 PM Physician Consultation: I spoke with wound care. They will have someone wrap the patients wound.     The patient was signed out to Dr. Marzena Napoles MD, at the end of my shift. Sign out included relevant details of history, physical, and work up to date. She will follow up on plan of care. Anticipate discharge once patient returns from wound care.     ALMA        Diagnosis  The primary encounter diagnosis was Hypoglycemia. A diagnosis of Wounds, multiple open, lower extremity, unspecified laterality, sequela was also pertinent to this visit.    Follow Up:  Ayanna Perry MD  [address] [address]  [phone]    Call in 1 day         New Prescriptions    No medications on file       Pt is discharged in good condition.    Critical Care time spent on this patient outside of billable procedures:  None    _______________________________________________  I have reviewed the information recorded by the scribe for accuracy and agree with its contents.    Nacho Niño acting as scribe for Tulio Byrne MD    Physician: Dr. Tulio Byrne  Physician #: [id number]  Scribe: Nacho Byrne MD  12/07/17 1538

## 2024-05-06 ENCOUNTER — OFFICE VISIT (OUTPATIENT)
Age: 39
End: 2024-05-06
Payer: COMMERCIAL

## 2024-05-06 VITALS
WEIGHT: 103 LBS | HEIGHT: 64 IN | TEMPERATURE: 98.6 F | HEART RATE: 84 BPM | OXYGEN SATURATION: 92 % | BODY MASS INDEX: 17.58 KG/M2

## 2024-05-06 DIAGNOSIS — M54.6 THORACIC SPINE PAIN: Primary | ICD-10-CM

## 2024-05-06 DIAGNOSIS — M54.2 CERVICAL PAIN (NECK): ICD-10-CM

## 2024-05-06 DIAGNOSIS — M41.9 SCOLIOSIS OF LUMBAR SPINE, UNSPECIFIED SCOLIOSIS TYPE: ICD-10-CM

## 2024-05-06 PROCEDURE — 99204 OFFICE O/P NEW MOD 45 MIN: CPT | Performed by: PHYSICAL MEDICINE & REHABILITATION

## 2024-05-06 RX ORDER — MELOXICAM 15 MG/1
15 TABLET ORAL DAILY
Qty: 30 TABLET | Refills: 0 | Status: SHIPPED | OUTPATIENT
Start: 2024-05-06

## 2024-05-06 NOTE — PROGRESS NOTES
history.      Social History     Tobacco Use    Smoking status: Every Day     Current packs/day: 1.00     Average packs/day: 1 pack/day for 19.2 years (19.2 ttl pk-yrs)     Types: Cigarettes     Start date: 3/5/2005    Smokeless tobacco: Never   Substance Use Topics    Alcohol use: Not Currently       Work status: N/A.  Marital status: N/A.     Current Outpatient Medications   Medication Sig Dispense Refill    meloxicam (MOBIC) 15 MG tablet Take 1 tablet by mouth daily 30 tablet 0    vitamin D (ERGOCALCIFEROL) 1.25 MG (12076 UT) CAPS capsule Take 1 capsule by mouth once a week 12 capsule 0    LORazepam (ATIVAN) 0.5 MG tablet Take 1 tablet by mouth.       No current facility-administered medications for this visit.       Allergies   Allergen Reactions    Latex Itching    Prednisone Other (See Comments)     Severe anxiety, loss of appetite, insomnia            Family History   Problem Relation Age of Onset    Hypertension Father     Diabetes Father     Liver Cancer Father     No Known Problems Sister          REVIEW OF SYSTEMS  Constitutional symptoms: Negative  Eyes: Negative  Ears, Nose, Throat, and Mouth: Negative  Cardiovascular: Negative  Respiratory: Negative  Genitourinary: Negative  Integumentary (Skin and/or breast): Negative  Musculoskeletal: Positive for mid back, neck and shoulders pain  Extremities: Negative for edema.  Endocrine/Rheumatologic: Negative  Hematologic/Lymphatic: Negative  Allergic/Immunologic: Negative  Psychiatric: Negative       PHYSICAL EXAMINATION  Pulse 84   Temp 98.6 °F (37 °C) (Skin)   Ht 1.626 m (5' 4\")   Wt 46.7 kg (103 lb)   SpO2 92%   BMI 17.68 kg/m²     CONSTITUTIONAL: NAD, A&O x 3  HEART: Regular rate and rhythm  GASTROINTESTINAL: Positive bowel sounds, soft, nontender, and nondistended  LUNGS: Clear to auscultation bilaterally.   SKIN: Negative for rash.   RANGE OF MOTION: The patient has full passive range of motion in all four extremities.  SENSATION: Sensation is

## 2024-05-13 ENCOUNTER — TELEPHONE (OUTPATIENT)
Age: 39
End: 2024-05-13

## 2024-05-13 DIAGNOSIS — M41.9 SCOLIOSIS OF LUMBAR SPINE, UNSPECIFIED SCOLIOSIS TYPE: Primary | ICD-10-CM

## 2024-05-13 DIAGNOSIS — M54.2 CERVICAL PAIN (NECK): ICD-10-CM

## 2024-05-13 DIAGNOSIS — M54.6 THORACIC SPINE PAIN: ICD-10-CM

## 2024-05-13 NOTE — TELEPHONE ENCOUNTER
Patient called after reading the side effects of her   meloxicam (MOBIC) 15 MG tablet prescription. States she is concerned about taking it due to the stomach side effects and chest issues. She is asking if there is a different medication that would help her pain without these side effects.    Please review and advise [phone]

## 2024-05-13 NOTE — TELEPHONE ENCOUNTER
All the NSAIDS have the same side effects and black box warning. The note states she did not want a anti-neuritc.  Check with Dr. Gongora to see what he wants to do.

## 2024-05-14 NOTE — TELEPHONE ENCOUNTER
I verbally discussed with Dr. Gongora and he stated what NP, Venecia Major stated is correct and the only other alternative would be PT because that does not have any side affects. I tried calling the pt to discuss and there was no answer. I left a message with the office number and my name asking for a call back to discuss.

## 2024-05-15 NOTE — TELEPHONE ENCOUNTER
Pt called back and I spoke with her. I let her know what Dr. Gongora and NP Venecia Major had discussed and the pt let me know she is not interested in the medication prescribed. I let her know she was not interested in the anti neurolytic medications when she was seen in office. I did let her know Dr. Gongora did say the only thing we can do that has no side affects is Physical therapy. She stated she would love to go back for more PT. Pt did mention her father was on Gabapentin PRN and I let the pt know that is a anti neurolytic and NOT a PRN medication. I stated I have no clue why her father was prescribed that medication but that is not a PRN med. She was confused on that and stated she would have lots of questions before going on another medication that she would like to talk to a doctor about. I let her know that is fine and we will put in a referral to PT and I will call and let her know when it has been sent. She requested [facility]. The facility she has used before.

## 2024-05-15 NOTE — TELEPHONE ENCOUNTER
I tried calling the pt to discuss and there was no answer. I Left a detailed voice message with the office phone number and my name. I will try once more at the end of the day to try getting a hold of the pt.

## 2024-06-13 ASSESSMENT — ENCOUNTER SYMPTOMS: SHORTNESS OF BREATH: 0

## 2024-06-14 ENCOUNTER — OFFICE VISIT (OUTPATIENT)
Facility: CLINIC | Age: 39
End: 2024-06-14

## 2024-06-14 ENCOUNTER — TELEPHONE (OUTPATIENT)
Facility: CLINIC | Age: 39
End: 2024-06-14

## 2024-06-14 VITALS
OXYGEN SATURATION: 100 % | DIASTOLIC BLOOD PRESSURE: 68 MMHG | BODY MASS INDEX: 17.42 KG/M2 | SYSTOLIC BLOOD PRESSURE: 104 MMHG | HEART RATE: 77 BPM | TEMPERATURE: 97.3 F | HEIGHT: 64 IN | RESPIRATION RATE: 17 BRPM | WEIGHT: 102 LBS

## 2024-06-14 DIAGNOSIS — M54.2 NECK PAIN, CHRONIC: ICD-10-CM

## 2024-06-14 DIAGNOSIS — M54.6 CHRONIC MIDLINE THORACIC BACK PAIN: Primary | ICD-10-CM

## 2024-06-14 DIAGNOSIS — G89.29 CHRONIC MIDLINE THORACIC BACK PAIN: Primary | ICD-10-CM

## 2024-06-14 DIAGNOSIS — G89.29 NECK PAIN, CHRONIC: ICD-10-CM

## 2024-06-14 DIAGNOSIS — B02.8 HERPES ZOSTER WITH COMPLICATION: ICD-10-CM

## 2024-06-14 DIAGNOSIS — Z02.89 ENCOUNTER FOR COMPLETION OF FORM WITH PATIENT: ICD-10-CM

## 2024-06-14 SDOH — ECONOMIC STABILITY: FOOD INSECURITY: WITHIN THE PAST 12 MONTHS, YOU WORRIED THAT YOUR FOOD WOULD RUN OUT BEFORE YOU GOT MONEY TO BUY MORE.: NEVER TRUE

## 2024-06-14 SDOH — ECONOMIC STABILITY: FOOD INSECURITY: WITHIN THE PAST 12 MONTHS, THE FOOD YOU BOUGHT JUST DIDN'T LAST AND YOU DIDN'T HAVE MONEY TO GET MORE.: NEVER TRUE

## 2024-06-14 SDOH — ECONOMIC STABILITY: INCOME INSECURITY: HOW HARD IS IT FOR YOU TO PAY FOR THE VERY BASICS LIKE FOOD, HOUSING, MEDICAL CARE, AND HEATING?: NOT HARD AT ALL

## 2024-06-14 ASSESSMENT — PATIENT HEALTH QUESTIONNAIRE - PHQ9
2. FEELING DOWN, DEPRESSED OR HOPELESS: NOT AT ALL
SUM OF ALL RESPONSES TO PHQ QUESTIONS 1-9: 0
SUM OF ALL RESPONSES TO PHQ9 QUESTIONS 1 & 2: 0
1. LITTLE INTEREST OR PLEASURE IN DOING THINGS: NOT AT ALL
SUM OF ALL RESPONSES TO PHQ QUESTIONS 1-9: 0

## 2024-06-14 ASSESSMENT — ENCOUNTER SYMPTOMS: BACK PAIN: 1

## 2024-06-14 NOTE — PROGRESS NOTES
Dori Nino presents today for   Chief Complaint   Patient presents with    Forms completion     Snap forms        Is someone accompanying this pt? no    Is the patient using any DME equipment during OV? no    Depression Screenin/14/2024    11:34 AM 2024     1:29 PM 2024     3:45 PM 2023     1:46 PM 2023     3:30 PM 2023     2:47 PM 3/15/2023     3:40 PM   PHQ-9 Questionaire   Little interest or pleasure in doing things 0 0 0 0 0 0 0   Feeling down, depressed, or hopeless 0 0 0 0 0 0 0   Trouble falling or staying asleep, or sleeping too much  1        Feeling tired or having little energy  1        Poor appetite or overeating  0        Feeling bad about yourself - or that you are a failure or have let yourself or your family down  0        Trouble concentrating on things, such as reading the newspaper or watching television  0        Moving or speaking so slowly that other people could have noticed. Or the opposite - being so fidgety or restless that you have been moving around a lot more than usual  0        Thoughts that you would be better off dead, or of hurting yourself in some way  0        PHQ-9 Total Score 0 2 0 0 0 0 0   If you checked off any problems, how difficult have these problems made it for you to do your work, take care of things at home, or get along with other people?  1             ANN 7-Anxiety       2024     1:28 PM 2023     9:44 AM 3/15/2023     3:41 PM   ANN-7 SCREENING   Feeling nervous, anxious, or on edge Not at all Not at all Several days   Not being able to stop or control worrying Not at all Not at all Several days   Worrying too much about different things Not at all Not at all Several days   Trouble relaxing Several days Not at all Several days   Being so restless that it is hard to sit still Not at all Not at all Several days   Becoming easily annoyed or irritable Not at all Not at all Several days   Feeling afraid as if something 
and Affect: Mood normal.         Thought Content: Thought content normal.         Judgment: Judgment normal.       Total time spent: 40 minutes    ANGELA HowardC

## 2024-06-17 ENCOUNTER — HOSPITAL ENCOUNTER (OUTPATIENT)
Facility: HOSPITAL | Age: 39
Setting detail: RECURRING SERIES
Discharge: HOME | End: 2024-06-20
Payer: COMMERCIAL

## 2024-06-17 PROCEDURE — 97162 PT EVAL MOD COMPLEX 30 MIN: CPT

## 2024-06-17 NOTE — THERAPY EVALUATION
Exam Findings:  POSTURE/OBSERVATION: flattening of normal c/s lordosis, slouched sitting posture with significant GHJ IR, thoracic levoscoliosis/lumbar dextroscoliosis, L medical scap border winging. B elbow hyperextension.   ROM: c/s AROM flex 40 \"tight\", ext 50 p!,R SB 48 \"tight\", L SB 55, RR 72, LR 72. T/s AROM RR 45/LR 45, ext    STRENGTH AROM   Shoulder (seated) Left Right Left Right   Flexion 4-/5 4-/5 152 158   Abduction 4-/5 4-/5 150 132   ER 3+/5 4-/5 90 90   IR 4/5 4/5 FIR T4 FIR L inf angle   Elbow Left Right Left Right   Extension 3+/5 3+/5     Flexion 4-/5 4-/5     Scapular Left Right Left Right   Mid trap 3+/5 4-/5     Low trap 3/5 3/5     Serratus Anterior 3+/5 4-/5            PALPATION: TrP present to L UT, lev scap, SCM, R splenius capitus, L infraspinatus. TTP to L infraspinatus, lev scap, pec minor. Hypomobility noted to t/s PA spring, hypermobility noted to c/s PA spring and sideglides.  SPECIAL TEST: deep cervical flexor test: unable to clear head from table;  (-) c/s compression, (-) c/s distraction, (-) spurlings test  NEURO SCREEN: unremarkable.      Pt presents w/ sx suggesting/consistent with cervicalgia/cervical tension in presence of hypermobility.  Pt could benefit from PT to address impairments and functional limitations in order to improve pt's ability to complete ADLs.  ===========================================================================================  Evaluation Complexity:  History:  MEDIUM  Complexity : 1-2 comorbidities / personal factors will impact the outcome/ POC ; Examination:  MEDIUM Complexity : 3 Standardized tests and measures addressin body structure, function, activity limitation and / or participation in recreation  ;Presentation:  MEDIUM Complexity : Evolving with changing characteristics  ;Clinical Decision Making:  Neck Disability Index (NDI) = 32 % ; (30% - 48% Moderate Disability) = MODERATE Complexity  Overall Complexity Rating: MEDIUM  Problem List:

## 2024-06-17 NOTE — PROGRESS NOTES
PHYSICAL / OCCUPATIONAL THERAPY - DAILY TREATMENT NOTE (updated )  For Eval visit    Patient Name: Dori Nino    Date: 2024    : 1985  Insurance: Payor: [employer] [facility] [place] / Plan: [employer]ET[employer] BETTER [facility] / Product Type: *No Product type* /      Patient  verified yes     Visit #   Current / Total 1 8   Time   In / Out 12:58 1:40   Pain   In / Out 3/10 3/10   Subjective Functional Status/Changes: See POC     TREATMENT AREA =  see POC    OBJECTIVE           36 min   Eval - untimed                      Therapeutic Procedures:    Tx Min Billable or 1:1 Min (if diff from Tx Min) Procedure, Rationale, Specifics   6  38453 Therapeutic Exercise (timed):  increase ROM, strength, coordination, balance, and proprioception to improve patient's ability to progress to PLOF and address remaining functional goals. (see flow sheet as applicable)     Details if applicable:  HEP, see chart copy            Details if applicable:            Details if applicable:            Details if applicable:     6  [facility] Totals Reminder: bill using total billable min of TIMED therapeutic procedures (example: do not include dry needle or estim unattended, both untimed codes, in totals to left)  8-22 min = 1 unit; 23-37 min = 2 units; 38-52 min = 3 units; 53-67 min = 4 units; 68-82 min = 5 units   Total Total     [x]  Patient Education billed concurrently with other procedures   [x] Review HEP    [] Progressed/Changed HEP, detail:    [] Other detail:       Objective Information/Functional Measures/Assessment    See POC    Patient will continue to benefit from skilled PT / OT services to modify and progress therapeutic interventions, analyze and address functional mobility deficits, analyze and address ROM deficits, analyze and address strength deficits, analyze and address soft tissue restrictions, analyze and cue for proper movement patterns, analyze and modify for postural abnormalities, and instruct in home and

## 2024-06-25 ENCOUNTER — TELEPHONE (OUTPATIENT)
Facility: CLINIC | Age: 39
End: 2024-06-25

## 2024-06-25 NOTE — TELEPHONE ENCOUNTER
Pt called stating her food stamps were denied because of the form TAMI Arce completed for her. Pt states during her visit she was told she could stay out of work for a few months but on the form she marked she was able to work. Pt wants to know if it was a mistake made on the form, and if so can she have a new form filled out. Please advise.

## 2024-06-26 NOTE — TELEPHONE ENCOUNTER
Pt states when she was in the office TAMI Aishwarya told her she was going to place on exempt on the forms and that she would fax over the form over on Monday.    States her  called yesterday and told her she no longer qualifies for SNAP and patient states \" TAMI Aishwarya told me something different while I was there, it must've been a mistake.\"      stated \"even if it was a mistake on the form that pt would have to wait until July to reapply and start the process all over again\"     Pt advised of TAMI Aishwarya's decision \"She is able to participate in employment and activities without limitations and/or modifications.\"     Pt states \"I am confused as to why she denied it, because it's been two months since I had a shingles breakout and while I am doing better with that I keep throwing my shoulder out. That food stamp money really helps me alot.\"     Advised that I will print a list of [facility] food supply services that she denied during her last visit and have that mailed to her. As far as the SNAP forms, I am not sure if TAMI Neff will change her mind. Pt understood.

## 2024-07-01 ENCOUNTER — TELEPHONE (OUTPATIENT)
Facility: HOSPITAL | Age: 39
End: 2024-07-01

## 2024-07-01 NOTE — TELEPHONE ENCOUNTER
Called patient to inform that her auth is still pending with her insurance. Once approved, will call to schedule follow up visits. No answer, LVM.

## 2024-07-10 ENCOUNTER — HOSPITAL ENCOUNTER (OUTPATIENT)
Facility: HOSPITAL | Age: 39
Setting detail: RECURRING SERIES
Discharge: HOME | End: 2024-07-13
Payer: COMMERCIAL

## 2024-07-10 PROCEDURE — 97530 THERAPEUTIC ACTIVITIES: CPT

## 2024-07-10 PROCEDURE — 97110 THERAPEUTIC EXERCISES: CPT

## 2024-07-10 PROCEDURE — 97112 NEUROMUSCULAR REEDUCATION: CPT

## 2024-07-10 NOTE — PROGRESS NOTES
PHYSICAL / OCCUPATIONAL THERAPY - DAILY TREATMENT NOTE    Patient Name: Dori Nino    Date: 7/10/2024    : 1985  Insurance: Payor: [employer] [facility] [place] / Plan: [employer] [facility] [place] / Product Type: *No Product type* /      Patient  verified Yes     Visit #   Current / Total 2 8   Time   In / Out 223 2:58   Pain   In / Out 3/10 3/10   Subjective Functional Status/Changes: Pt reports she has been partially compliant with HEP; states she did not do them last week because she wasn't feeling well. Having difficulty with seated c/s retractions w/ TB.     TREATMENT AREA =  Neck pain [M54.2]  Pain in left shoulder [M25.512]     OBJECTIVE         Therapeutic Procedures:    Tx Min Billable or 1:1 Min (if diff from Tx Min) Procedure, Rationale, Specifics   17  78677 Therapeutic Exercise (timed):  increase ROM, strength, coordination, balance, and proprioception to improve patient's ability to progress to PLOF and address remaining functional goals. (see flow sheet as applicable)     Details if applicable:  see flow sheet     8  42528 Neuromuscular Re-Education (timed):  improve balance, coordination, kinesthetic sense, posture, core stability and proprioception to improve patient's ability to develop conscious control of individual muscles and awareness of position of extremities in order to progress to PLOF and address remaining functional goals. (see flow sheet as applicable)     Details if applicable:  see flow sheet   10  95563 Therapeutic Activity (timed):  use of dynamic activities replicating functional movements to increase ROM, strength, coordination, balance, and proprioception in order to improve patient's ability to progress to PLOF and address remaining functional goals.  (see flow sheet as applicable)     Details if applicable:  see flow sheet          Details if applicable:            Details if applicable:     35  [facility] Totals Reminder: bill using total billable min of TIMED therapeutic

## 2024-07-12 ENCOUNTER — HOSPITAL ENCOUNTER (OUTPATIENT)
Facility: HOSPITAL | Age: 39
Setting detail: RECURRING SERIES
Discharge: HOME | End: 2024-07-15
Payer: COMMERCIAL

## 2024-07-12 PROCEDURE — 97110 THERAPEUTIC EXERCISES: CPT

## 2024-07-12 PROCEDURE — 97530 THERAPEUTIC ACTIVITIES: CPT

## 2024-07-12 PROCEDURE — 97112 NEUROMUSCULAR REEDUCATION: CPT

## 2024-07-12 NOTE — PROGRESS NOTES
PHYSICAL / OCCUPATIONAL THERAPY - DAILY TREATMENT NOTE    Patient Name: Dori Nino    Date: 2024    : 1985  Insurance: Payor: [employer]TER [facility] [place] / Plan: [employer]ETNA BETTER [facility] [place] / Product Type: *No Product type* /      Patient  verified Yes     Visit #   Current / Total 3 8   Time   In / Out 225 301   Pain   In / Out 4 3   Subjective Functional Status/Changes: Patient reports that her left shoulder started hurting yesterday for no reason noted.      TREATMENT AREA =  Neck pain [M54.2]  Pain in left shoulder [M25.512]     OBJECTIVE    Therapeutic Procedures:    63185 Therapeutic Exercise (timed):  increase ROM, strength, coordination, balance, and proprioception to improve patient's ability to progress to PLOF and address remaining functional goals.   Tx Min Billable or 1:1 Min   (if diff from Tx Min) Details:   16  See flow sheet as applicable     25467 Neuromuscular Re-Education (timed):  improve balance, coordination, kinesthetic sense, posture, core stability and proprioception to improve patient's ability to develop conscious control of individual muscles and awareness of position of extremities in order to progress to PLOF and address remaining functional goals.   Tx Min Billable or 1:1 Min   (if diff from Tx Min) Details:   10  See flow sheet as applicable     29243 Therapeutic Activity (timed):  use of dynamic activities replicating functional movements to increase ROM, strength, coordination, balance, and proprioception in order to improve patient's ability to progress to PLOF and address remaining functional goals.   Tx Min Billable or 1:1 Min   (if diff from Tx Min) Details:   10  See flow sheet as applicable       36  [facility] Totals Reminder: bill using total billable min of TIMED therapeutic procedures (example: do not include dry needle or estim unattended, both untimed codes, in totals to left)  8-22 min = 1 unit; 23-37 min = 2 units; 38-52 min = 3 units; 53-67 min = 4 units;

## 2024-07-16 ENCOUNTER — HOSPITAL ENCOUNTER (OUTPATIENT)
Facility: HOSPITAL | Age: 39
Setting detail: RECURRING SERIES
Discharge: HOME | End: 2024-07-19
Payer: COMMERCIAL

## 2024-07-16 PROCEDURE — 97530 THERAPEUTIC ACTIVITIES: CPT

## 2024-07-16 PROCEDURE — 97110 THERAPEUTIC EXERCISES: CPT

## 2024-07-16 PROCEDURE — 97112 NEUROMUSCULAR REEDUCATION: CPT

## 2024-07-16 NOTE — PROGRESS NOTES
PHYSICAL / OCCUPATIONAL THERAPY - DAILY TREATMENT NOTE    Patient Name: Dori Nino    Date: 2024    : 1985  Insurance: Payor: [employer] [facility]TER [facility] [place] / Plan: [employer]ETNA BETTER [facility] [place] / Product Type: *No Product type* /      Patient  verified Yes     Visit #   Current / Total 4 8   Time   In / Out 223 300   Pain   In / Out 3 3   Subjective Functional Status/Changes: Patient states she continues to have difficulty with overhead reaching.      TREATMENT AREA =  Neck pain [M54.2]  Pain in left shoulder [M25.512]     OBJECTIVE    Therapeutic Procedures:      81697 Therapeutic Exercise (timed):  increase ROM, strength, coordination, balance, and proprioception to improve patient's ability to progress to PLOF and address remaining functional goals.   Tx Min Billable or 1:1 Min   (if diff from Tx Min) Details:   16   See flow sheet as applicable      94451 Neuromuscular Re-Education (timed):  improve balance, coordination, kinesthetic sense, posture, core stability and proprioception to improve patient's ability to develop conscious control of individual muscles and awareness of position of extremities in order to progress to PLOF and address remaining functional goals.   Tx Min Billable or 1:1 Min   (if diff from Tx Min) Details:   10   See flow sheet as applicable      61678 Therapeutic Activity (timed):  use of dynamic activities replicating functional movements to increase ROM, strength, coordination, balance, and proprioception in order to improve patient's ability to progress to PLOF and address remaining functional goals.   Tx Min Billable or 1:1 Min   (if diff from Tx Min) Details:   11   See flow sheet as applicable         37   [facility] Totals Reminder: bill using total billable min of TIMED therapeutic procedures (example: do not include dry needle or estim unattended, both untimed codes, in totals to left)  8-22 min = 1 unit; 23-37 min = 2 units; 38-52 min = 3 units; 53-67 min = 4 units; 68-82

## 2024-07-16 NOTE — THERAPY RECERTIFICATION
[facility]  [address] [address] Ph: [phone] Fx: [phone]  PHYSICAL THERAPY PROGRESS NOTE  Patient Name: Droi Nino : 1985   Treatment/Medical Diagnosis: Neck pain [M54.2]  Pain in left shoulder [M25.512]   Referral Source: Vinnie Gongora MD     Date of Initial Visit: 2024 Attended Visits: 4 Missed Visits: 0     SUMMARY OF TREATMENT  Patient states she has noticed 45% improvement with overall symptoms of neck and shoulder. Patient continues to demonstrate muscular weakness and reports difficulty with reaching overhead.     CURRENT STATUS    Short Term Goals: To be accomplished in 2 weeks  Pt will be ind and compliant with HEP for self management of sx  Status at eval: established on IE  Current: MET: reports partial compliance to HEP 7/10/2024  Long Term Goals: To be accomplished in 4 weeks  Pt will improve L shoulder ER strength to at least 4-/5 to improve ability to perform ADLs  Status at eval: 3+/5  Current; remains: 3+/5, 24  2. Improve L serratus and mid trap strength to at least 4-/5 to improve scapular stability for lifting/carrying  Status at eval: mid trap 3+/5, serratus 3+/5  Current: remains: mid trap 3+/5, serratus 3+/5. 24  3. Improve NDI by >/= 15% to indicate improved function  Status at eval: 32%  Current: progressin% 24      [place]or: [employer] [facility] [facility] [place] / Plan: [employer] BETTER [facility] [place] / Product Type: *No Product type* /     Non-Medicare, can change goals, can adjust or add frequency duration, no signature required      New Goals to be achieved in __5__ WEEKS  1.Pt will improve L shoulder ER strength to at least 4-/5 to improve ability to perform ADLs  AT PN: remains: 3+/5, 24  2. Improve L serratus and mid trap strength to at least 4-/5 to improve scapular stability for lifting/carrying  AT PN: remains: mid trap 3+/5, serratus 3+/5. 24  3. Improve NDI by >/= 15% to

## 2024-07-18 ENCOUNTER — APPOINTMENT (OUTPATIENT)
Facility: HOSPITAL | Age: 39
End: 2024-07-18
Payer: COMMERCIAL

## 2024-07-18 ENCOUNTER — TELEPHONE (OUTPATIENT)
Facility: HOSPITAL | Age: 39
End: 2024-07-18

## 2024-07-22 ENCOUNTER — TELEPHONE (OUTPATIENT)
Facility: HOSPITAL | Age: 39
End: 2024-07-22

## 2024-07-22 ENCOUNTER — HOSPITAL ENCOUNTER (OUTPATIENT)
Facility: HOSPITAL | Age: 39
Setting detail: RECURRING SERIES
Discharge: HOME | End: 2024-07-25
Payer: COMMERCIAL

## 2024-07-22 PROCEDURE — 97535 SELF CARE MNGMENT TRAINING: CPT

## 2024-07-22 PROCEDURE — 97110 THERAPEUTIC EXERCISES: CPT

## 2024-07-22 PROCEDURE — 97530 THERAPEUTIC ACTIVITIES: CPT

## 2024-07-22 PROCEDURE — 97112 NEUROMUSCULAR REEDUCATION: CPT

## 2024-07-22 NOTE — PROGRESS NOTES
(timed):  improve patient knowledge and understanding of pain reducing techniques, positioning, posture/ergonomics, home safety, activity modification, diagnosis/prognosis, and physical therapy expectations, procedures and progression  to improve patient's ability to progress to PLOF and address remaining functional goals.    Tx Min Billable or 1:1 Min   (if diff from Tx Min) Details:   8  See flow sheet as applicable        44   [facility] Totals Reminder: bill using total billable min of TIMED therapeutic procedures (example: do not include dry needle or estim unattended, both untimed codes, in totals to left)  8-22 min = 1 unit; 23-37 min = 2 units; 38-52 min = 3 units; 53-67 min = 4 units; 68-82 min = 5 units   Total Total         [x]  Patient Education billed concurrently with other procedures   [x] Review HEP    [] Progressed/Changed HEP, detail:    [] Other detail:       Objective Information/Functional Measures/Assessment    Initiated open book to increase mobility in upper thoracic and shoulders.     Patient will continue to benefit from skilled PT / OT services to modify and progress therapeutic interventions, analyze and address functional mobility deficits, analyze and address ROM deficits, analyze and address strength deficits, analyze and address soft tissue restrictions, analyze and cue for proper movement patterns, analyze and modify for postural abnormalities, analyze and address imbalance/dizziness, and instruct in home and community integration to address functional deficits and attain remaining goals.    Progress toward goals / Updated goals:  []  See Progress Note/Recertification    New Goals to be achieved in __5__ WEEKS  1.Pt will improve L shoulder ER strength to at least 4-/5 to improve ability to perform ADLs  AT PN: remains: 3+/5, 7/16/24  2. Improve L serratus and mid trap strength to at least 4-/5 to improve scapular stability for lifting/carrying  AT PN: remains: mid trap 3+/5, serratus

## 2024-07-24 ENCOUNTER — OFFICE VISIT (OUTPATIENT)
Age: 39
End: 2024-07-24
Payer: COMMERCIAL

## 2024-07-24 VITALS — RESPIRATION RATE: 16 BRPM | HEIGHT: 64 IN | BODY MASS INDEX: 17.51 KG/M2

## 2024-07-24 DIAGNOSIS — G89.29 CHRONIC LEFT SHOULDER PAIN: ICD-10-CM

## 2024-07-24 DIAGNOSIS — M25.512 CHRONIC LEFT SHOULDER PAIN: ICD-10-CM

## 2024-07-24 DIAGNOSIS — M25.312 INSTABILITY OF LEFT SHOULDER JOINT: Primary | ICD-10-CM

## 2024-07-24 PROCEDURE — 99213 OFFICE O/P EST LOW 20 MIN: CPT | Performed by: ORTHOPAEDIC SURGERY

## 2024-07-24 PROCEDURE — 73030 X-RAY EXAM OF SHOULDER: CPT | Performed by: ORTHOPAEDIC SURGERY

## 2024-07-24 NOTE — PROGRESS NOTES
TTP  -   -  Clavicle   Deformity -   -   TTP  -   -  Proximal Humerus   Deformity -   -   TTP  -   -  Deltoid Strength 5   5  Biceps Strength 5   5  Biceps Deformity -   -  Biceps Groove Pain -   -  Impingement Sign -   -       IMAGING:  Imaging read by myself and interpreted as follows:  4 view x-rays of the left shoulder taken the office today are normal    ASSESSMENT & PLAN  Diagnosis: Left shoulder multidirectional instability, hypermobility syndrome    Dori Nino has hypermobility of the left shoulder joint.  I suspect that she is having some micro instability which is atraumatic and multidirectional.  My recommendation for this is physical therapy.  I do not recommend any surgery at this time more likely in the future.  No advanced imaging at this time.  I ordered physical therapy and I will see her back if her symptoms persist.    Prescription medication management discussed. In the interim prior to the next visit should symptoms worsen I recommend Tylenol or OTC NSAIDs to be taken as needed as long as these medications are not medically contraindicated.     Plan was discussed in detail with patient, all questions were answered, and patient voiced understanding of plan.    Electronically signed by: Daniel Miranda MD    Note: This note was completed using voice recognition software.  Any typographical/name errors or mistakes are unintentional.

## 2024-07-25 ENCOUNTER — HOSPITAL ENCOUNTER (OUTPATIENT)
Facility: HOSPITAL | Age: 39
Setting detail: RECURRING SERIES
Discharge: HOME | End: 2024-07-28
Payer: COMMERCIAL

## 2024-07-25 PROCEDURE — 97535 SELF CARE MNGMENT TRAINING: CPT

## 2024-07-25 PROCEDURE — 97110 THERAPEUTIC EXERCISES: CPT

## 2024-07-25 PROCEDURE — 97530 THERAPEUTIC ACTIVITIES: CPT

## 2024-07-25 PROCEDURE — 97112 NEUROMUSCULAR REEDUCATION: CPT

## 2024-07-25 NOTE — PROGRESS NOTES
PHYSICAL / OCCUPATIONAL THERAPY - DAILY TREATMENT NOTE    Patient Name: Dori Nino    Date: 2024    : 1985  Insurance: Payor: [facility] / Plan: AET BETTER [facility] / Product Type: *No Product type* /      Patient  verified Yes     Visit #   Current / Total 2 10   Time   In / Out 302 342   Pain   In / Out 2 2   Subjective Functional Status/Changes: Patient states she saw an Orthopedic doctor for her shoulder yesterday and he states that the ligaments in her shoulder a very stretched.      TREATMENT AREA =  Neck pain [M54.2]  Pain in left shoulder [M25.512]     OBJECTIVE      Therapeutic Procedures:      34149 Therapeutic Exercise (timed):  increase ROM, strength, coordination, balance, and proprioception to improve patient's ability to progress to PLOF and address remaining functional goals.   Tx Min Billable or 1:1 Min   (if diff from Tx Min) Details:   12   See flow sheet as applicable      05549 Neuromuscular Re-Education (timed):  improve balance, coordination, kinesthetic sense, posture, core stability and proprioception to improve patient's ability to develop conscious control of individual muscles and awareness of position of extremities in order to progress to PLOF and address remaining functional goals.   Tx Min Billable or 1:1 Min   (if diff from Tx Min) Details:   10   See flow sheet as applicable      03563 Therapeutic Activity (timed):  use of dynamic activities replicating functional movements to increase ROM, strength, coordination, balance, and proprioception in order to improve patient's ability to progress to PLOF and address remaining functional goals.   Tx Min Billable or 1:1 Min   (if diff from Tx Min) Details:   10   See flow sheet as applicable      17076 Self Care/Home Management (timed):  improve patient knowledge and understanding of pain reducing techniques, positioning, posture/ergonomics, home safety, activity modification, diagnosis/prognosis, and

## 2024-07-29 ENCOUNTER — OFFICE VISIT (OUTPATIENT)
Age: 39
End: 2024-07-29
Payer: COMMERCIAL

## 2024-07-29 VITALS
BODY MASS INDEX: 17.24 KG/M2 | TEMPERATURE: 98 F | HEIGHT: 64 IN | WEIGHT: 101 LBS | HEART RATE: 89 BPM | OXYGEN SATURATION: 100 %

## 2024-07-29 DIAGNOSIS — M41.9 SCOLIOSIS OF LUMBAR SPINE, UNSPECIFIED SCOLIOSIS TYPE: ICD-10-CM

## 2024-07-29 DIAGNOSIS — M54.6 THORACIC SPINE PAIN: Primary | ICD-10-CM

## 2024-07-29 DIAGNOSIS — M54.2 CERVICAL PAIN (NECK): ICD-10-CM

## 2024-07-29 PROCEDURE — 99213 OFFICE O/P EST LOW 20 MIN: CPT | Performed by: PHYSICAL MEDICINE & REHABILITATION

## 2024-07-29 NOTE — PROGRESS NOTES
[facility]  [address]  [place] [id number]  Tel: [phone]  Fax: [phone]          PROGRESS NOTE      HISTORY OF PRESENT ILLNESS:  The patient is a 38 y.o. female and was seen today for follow up of chronic thoracic midline pain, w/o injury. Pt additionally c/o neck and bilateral shoulder pain. Patient says her pain is progressive in nature. She says to her knowledge she is not pregnant, trying to become pregnant, or breast feeding at this time. She denies change in bowel or bladder habits. She denies loss of balance, falls, or impairments manual dexterity. Her pain is exacerbated by sitting for prolonged periods of time. She denies recent fevers, weight loss, rashes, or skin sores. She denies a hx of stomach ulcers or bleeding disorders. She denies a hx of history of spinal surgery or injections. She denies recent chiropractic care. Pt completed PT with benefit. She is compliant with her daily HEP. She denies a hx of DM. She reports that to her knowledge her kidneys function properly, GFR over 60 on 4/10/2023. She denies having taken any medication to manage her symptoms. PmHx of ANN, shingles, smoker. Pt notes experiencing intolerance to steroids: anxiety, loss of appetite, insomnia. Note from JUVE Cerna dated 1/23/2024 indicating patient was seen for chronic midline thoracic back pain. T spine XR dated 1/16/2024 films were independently reviewed by me. Per report, Mild dextroconvex curvature to the lower thoracic and partially visualized lumbar spine. The patient is Right Handed. At her last clinical appointment, She is not interested in surgery or injection at this time. I discussed starting her on a neuropathic medication, pt declined. I will order Meloxicam 15 mg QHS x 1 month with food. I recommended the patient continue to perform her HEP everyday. The patient is Neurologically intact. I will see the patient back in 2 months or

## 2024-07-31 ENCOUNTER — HOSPITAL ENCOUNTER (OUTPATIENT)
Facility: HOSPITAL | Age: 39
Setting detail: RECURRING SERIES
Discharge: HOME | End: 2024-08-03
Payer: COMMERCIAL

## 2024-07-31 PROCEDURE — 97535 SELF CARE MNGMENT TRAINING: CPT

## 2024-07-31 PROCEDURE — 97530 THERAPEUTIC ACTIVITIES: CPT

## 2024-07-31 PROCEDURE — 97110 THERAPEUTIC EXERCISES: CPT

## 2024-07-31 PROCEDURE — 97112 NEUROMUSCULAR REEDUCATION: CPT

## 2024-07-31 NOTE — PROGRESS NOTES
PHYSICAL / OCCUPATIONAL THERAPY - DAILY TREATMENT NOTE    Patient Name: Dori Nino    Date: 2024    : 1985  Insurance: Payor: [facility]EV BETTER [facility] / Plan: AET BETTER [facility] / Product Type: *No Product type* /      Patient  verified Yes     Visit #   Current / Total 3 10   Time   In / Out 305 340   Pain   In / Out 3 2.5   Subjective Functional Status/Changes: Patient states she aggravated her shoulder when she was trying to back her truck out of the drive way while turned sideways.      TREATMENT AREA =  Neck pain [M54.2]  Pain in left shoulder [M25.512]     OBJECTIVE      Therapeutic Procedures:      89805 Therapeutic Exercise (timed):  increase ROM, strength, coordination, balance, and proprioception to improve patient's ability to progress to PLOF and address remaining functional goals.   Tx Min Billable or 1:1 Min   (if diff from Tx Min) Details:   9   See flow sheet as applicable      39951 Neuromuscular Re-Education (timed):  improve balance, coordination, kinesthetic sense, posture, core stability and proprioception to improve patient's ability to develop conscious control of individual muscles and awareness of position of extremities in order to progress to PLOF and address remaining functional goals.   Tx Min Billable or 1:1 Min   (if diff from Tx Min) Details:   10   See flow sheet as applicable      34256 Therapeutic Activity (timed):  use of dynamic activities replicating functional movements to increase ROM, strength, coordination, balance, and proprioception in order to improve patient's ability to progress to PLOF and address remaining functional goals.   Tx Min Billable or 1:1 Min   (if diff from Tx Min) Details:   8   See flow sheet as applicable      70952 Self Care/Home Management (timed):  improve patient knowledge and understanding of pain reducing techniques, positioning, posture/ergonomics, home safety, activity modification, diagnosis/prognosis, and physical

## 2024-08-02 ENCOUNTER — APPOINTMENT (OUTPATIENT)
Facility: HOSPITAL | Age: 39
End: 2024-08-02
Payer: COMMERCIAL

## 2024-08-06 ENCOUNTER — HOSPITAL ENCOUNTER (OUTPATIENT)
Facility: HOSPITAL | Age: 39
Setting detail: RECURRING SERIES
Discharge: HOME | End: 2024-08-09
Payer: COMMERCIAL

## 2024-08-06 PROCEDURE — 97110 THERAPEUTIC EXERCISES: CPT

## 2024-08-06 PROCEDURE — 97535 SELF CARE MNGMENT TRAINING: CPT

## 2024-08-06 PROCEDURE — 97530 THERAPEUTIC ACTIVITIES: CPT

## 2024-08-06 PROCEDURE — 97112 NEUROMUSCULAR REEDUCATION: CPT

## 2024-08-06 NOTE — PROGRESS NOTES
PN:  progressin% 24     Next PN/ RC due 24  Auth due 8th visit    PLAN  - Continue Plan of Care    TEJINDER SALMON PTA    2024    11:00 AM  If an interpreting service was utilized for treatment of this patient, the contents of this document represent the material reviewed with the patient via the .     Future Appointments   Date Time Provider Department Center   2024  2:20 PM Tejinder Salmon PTA [facility]   2024  3:40 PM Goring, Fer, PT [facility]   8/15/2024  3:40 PM Goring, Fer, PT [facility]   2024  3:40 PM Goring, Fer, PT [facility]   2024  2:30 PM Aishwarya Arce, NP-C [facility]

## 2024-08-07 ENCOUNTER — APPOINTMENT (OUTPATIENT)
Facility: HOSPITAL | Age: 39
End: 2024-08-07
Payer: COMMERCIAL

## 2024-08-08 ENCOUNTER — HOSPITAL ENCOUNTER (OUTPATIENT)
Facility: HOSPITAL | Age: 39
Setting detail: RECURRING SERIES
Discharge: HOME | End: 2024-08-11
Payer: COMMERCIAL

## 2024-08-08 PROCEDURE — 97110 THERAPEUTIC EXERCISES: CPT

## 2024-08-08 PROCEDURE — 97535 SELF CARE MNGMENT TRAINING: CPT

## 2024-08-08 PROCEDURE — 97112 NEUROMUSCULAR REEDUCATION: CPT

## 2024-08-08 PROCEDURE — 97530 THERAPEUTIC ACTIVITIES: CPT

## 2024-08-08 NOTE — PROGRESS NOTES
PHYSICAL / OCCUPATIONAL THERAPY - DAILY TREATMENT NOTE    Patient Name: Dori Nino    Date: 2024    : 1985  Insurance: Payor: [facility]EV [facility] / Plan: AET BETTER [facility] / Product Type: *No Product type* /      Patient  verified Yes     Visit #   Current / Total 5 10   Time   In / Out 216 303   Pain   In / Out 2 3   Subjective Functional Status/Changes: Patient states she feel her shoulder is improving and has not bothered her much in the past couple of days.      TREATMENT AREA =  Neck pain [M54.2]  Pain in left shoulder [M25.512]     OBJECTIVE      Therapeutic Procedures:      15858 Therapeutic Exercise (timed):  increase ROM, strength, coordination, balance, and proprioception to improve patient's ability to progress to PLOF and address remaining functional goals.   Tx Min Billable or 1:1 Min   (if diff from Tx Min) Details:   16   See flow sheet as applicable      05928 Neuromuscular Re-Education (timed):  improve balance, coordination, kinesthetic sense, posture, core stability and proprioception to improve patient's ability to develop conscious control of individual muscles and awareness of position of extremities in order to progress to PLOF and address remaining functional goals.   Tx Min Billable or 1:1 Min   (if diff from Tx Min) Details:   10   See flow sheet as applicable      83700 Therapeutic Activity (timed):  use of dynamic activities replicating functional movements to increase ROM, strength, coordination, balance, and proprioception in order to improve patient's ability to progress to PLOF and address remaining functional goals.   Tx Min Billable or 1:1 Min   (if diff from Tx Min) Details:   8   See flow sheet as applicable      29480 Self Care/Home Management (timed):  improve patient knowledge and understanding of pain reducing techniques, positioning, posture/ergonomics, home safety, activity modification, diagnosis/prognosis, and physical therapy expectations,

## 2024-08-12 ENCOUNTER — TELEPHONE (OUTPATIENT)
Facility: HOSPITAL | Age: 39
End: 2024-08-12

## 2024-08-13 ENCOUNTER — APPOINTMENT (OUTPATIENT)
Facility: HOSPITAL | Age: 39
End: 2024-08-13
Payer: COMMERCIAL

## 2024-08-15 ENCOUNTER — HOSPITAL ENCOUNTER (OUTPATIENT)
Facility: HOSPITAL | Age: 39
Setting detail: RECURRING SERIES
Discharge: HOME | End: 2024-08-18
Payer: COMMERCIAL

## 2024-08-15 PROCEDURE — 97110 THERAPEUTIC EXERCISES: CPT

## 2024-08-15 PROCEDURE — 97112 NEUROMUSCULAR REEDUCATION: CPT

## 2024-08-15 NOTE — PROGRESS NOTES
PHYSICAL / OCCUPATIONAL THERAPY - DAILY TREATMENT NOTE    Patient Name: Dori Nino    Date: 8/15/2024    : 1985  Insurance: [facility]or: [facility] / Plan: [employer] [facility] / Product Type: *No Product type* /      Patient  verified Yes     Visit #   Current / Total 6 10   Time   In / Out 3:50 4:20   Pain   In / Out 2/10 2/10   Subjective Functional Status/Changes: Pt. Main challenge is prolong sitting >40 minutes before becoming uncomfortable.     TREATMENT AREA =  Neck pain [M54.2]  Pain in left shoulder [M25.512]     Therapeutic Procedures:    92870 Therapeutic Exercise (timed):  increase ROM, strength, coordination, balance, and proprioception to improve patient's ability to progress to PLOF and address remaining functional goals.   Tx Min Billable or 1:1 Min   (if diff from Tx Min) Details:   15  See flow sheet as applicable     88238 Neuromuscular Re-Education (timed):  improve balance, coordination, kinesthetic sense, posture, core stability and proprioception to improve patient's ability to develop conscious control of individual muscles and awareness of position of extremities in order to progress to PLOF and address remaining functional goals.   Tx Min Billable or 1:1 Min   (if diff from Tx Min) Details:   15  See flow sheet as applicable         30  [facility] BC Totals Reminder: bill using total billable min of TIMED therapeutic procedures (example: do not include dry needle or estim unattended, both untimed codes, in totals to left)  8-22 min = 1 unit; 23-37 min = 2 units; 38-52 min = 3 units; 53-67 min = 4 units; 68-82 min = 5 units   Total Total     Patient will continue to benefit from skilled PT / OT services to modify and progress therapeutic interventions, analyze and address functional mobility deficits, and analyze and address ROM deficits to address functional deficits and attain remaining goals.    Progress toward goals / Updated goals:  []  See Progress

## 2024-08-20 ENCOUNTER — HOSPITAL ENCOUNTER (OUTPATIENT)
Facility: HOSPITAL | Age: 39
Setting detail: RECURRING SERIES
Discharge: HOME | End: 2024-08-23
Payer: COMMERCIAL

## 2024-08-20 PROCEDURE — 97110 THERAPEUTIC EXERCISES: CPT

## 2024-08-23 ENCOUNTER — HOSPITAL ENCOUNTER (OUTPATIENT)
Facility: HOSPITAL | Age: 39
Setting detail: RECURRING SERIES
End: 2024-08-23
Payer: COMMERCIAL

## 2024-08-23 NOTE — DISCHARGE SUMMARY
JERROD ALEMAN [facility]  [address] [address] Ph: [phone] Fx: [phone]  DISCHARGE SUMMARY  Patient Name: Dori Nino : 1985   Treatment/Medical Diagnosis: Neck pain [M54.2]  Pain in left shoulder [M25.512]   Referral Source: Vinnie Gongora MD     Date of Initial Visit: 24 Attended Visits: 11 Missed Visits: 0     SUMMARY OF TREATMENT  Summary of treatment included functional strength, flexibility and neuromuscular re-education in order to improve function with ADLs.    CURRENT STATUS  1.Pt will improve L shoulder ER strength to at least 4-/5 to improve ability to perform ADLs  AT PN: remains: 3+/5, 24  Current: 4-/5 24  2. Improve L serratus and mid trap strength to at least 4-/5 to improve scapular stability for lifting/carrying  AT PN: remains: mid trap 3+/5, serratus 3+/5. 24  Current: remains: mid trap 3+/5, serratus 3+/5. 8  3. Improve NDI by >/= 15% to indicate improved function  AT PN:  progressin% 24    non-Medicare    RECOMMENDATIONS  Discontinue therapy. Progressing towards or have reached established goals.    If you have any questions/comments please contact us directly at [phone].   Thank you for allowing us to assist in the care of your patient.    Fer Foote, PT       2024       4:12 PM    Medicaid Ins, signature required for DC ** NOTE TO PHYSICIAN:  Your patient's insurance requires this discharge note be signed and returned **    ___ I have read the above report and request that my patient be discharged from therapy.     Physician's Signature:_________________________   DATE:_________   TIME:________                           Vinnie Gongora MD    ** Signature, Date and Time must be completed for valid certification **  Please sign and fax to [facility]

## 2024-11-13 ENCOUNTER — OFFICE VISIT (OUTPATIENT)
Facility: CLINIC | Age: 39
End: 2024-11-13
Payer: COMMERCIAL

## 2024-11-13 VITALS
OXYGEN SATURATION: 98 % | WEIGHT: 105 LBS | DIASTOLIC BLOOD PRESSURE: 72 MMHG | TEMPERATURE: 97.3 F | HEIGHT: 64 IN | BODY MASS INDEX: 17.93 KG/M2 | HEART RATE: 74 BPM | SYSTOLIC BLOOD PRESSURE: 105 MMHG | RESPIRATION RATE: 12 BRPM

## 2024-11-13 DIAGNOSIS — Z12.31 ENCOUNTER FOR SCREENING MAMMOGRAM FOR BREAST CANCER: ICD-10-CM

## 2024-11-13 DIAGNOSIS — M25.312 INSTABILITY OF LEFT SHOULDER JOINT: Primary | ICD-10-CM

## 2024-11-13 DIAGNOSIS — R11.0 NAUSEA: ICD-10-CM

## 2024-11-13 DIAGNOSIS — Z13.220 SCREENING FOR LIPID DISORDERS: ICD-10-CM

## 2024-11-13 DIAGNOSIS — B02.8 HERPES ZOSTER WITH COMPLICATION: ICD-10-CM

## 2024-11-13 DIAGNOSIS — G89.29 CHRONIC MIDLINE THORACIC BACK PAIN: ICD-10-CM

## 2024-11-13 DIAGNOSIS — M54.6 CHRONIC MIDLINE THORACIC BACK PAIN: ICD-10-CM

## 2024-11-13 PROBLEM — M54.2 NECK PAIN, CHRONIC: Status: RESOLVED | Noted: 2024-06-14 | Resolved: 2024-11-13

## 2024-11-13 PROCEDURE — 99214 OFFICE O/P EST MOD 30 MIN: CPT

## 2024-11-13 RX ORDER — ONDANSETRON 4 MG/1
4 TABLET, FILM COATED ORAL EVERY 8 HOURS PRN
Qty: 30 TABLET | Refills: 1 | Status: SHIPPED | OUTPATIENT
Start: 2024-11-13

## 2024-11-13 SDOH — ECONOMIC STABILITY: FOOD INSECURITY: WITHIN THE PAST 12 MONTHS, THE FOOD YOU BOUGHT JUST DIDN'T LAST AND YOU DIDN'T HAVE MONEY TO GET MORE.: PATIENT DECLINED

## 2024-11-13 SDOH — ECONOMIC STABILITY: FOOD INSECURITY: WITHIN THE PAST 12 MONTHS, YOU WORRIED THAT YOUR FOOD WOULD RUN OUT BEFORE YOU GOT MONEY TO BUY MORE.: PATIENT DECLINED

## 2024-11-13 SDOH — ECONOMIC STABILITY: INCOME INSECURITY: HOW HARD IS IT FOR YOU TO PAY FOR THE VERY BASICS LIKE FOOD, HOUSING, MEDICAL CARE, AND HEATING?: PATIENT DECLINED

## 2024-11-13 ASSESSMENT — PATIENT HEALTH QUESTIONNAIRE - PHQ9
SUM OF ALL RESPONSES TO PHQ QUESTIONS 1-9: 0
SUM OF ALL RESPONSES TO PHQ9 QUESTIONS 1 & 2: 0
SUM OF ALL RESPONSES TO PHQ QUESTIONS 1-9: 0
SUM OF ALL RESPONSES TO PHQ QUESTIONS 1-9: 0
1. LITTLE INTEREST OR PLEASURE IN DOING THINGS: NOT AT ALL
2. FEELING DOWN, DEPRESSED OR HOPELESS: NOT AT ALL
SUM OF ALL RESPONSES TO PHQ QUESTIONS 1-9: 0

## 2024-11-13 ASSESSMENT — ENCOUNTER SYMPTOMS
SHORTNESS OF BREATH: 0
NAUSEA: 1

## 2024-11-13 NOTE — PROGRESS NOTES
Dori Nino presents today for   Chief Complaint   Patient presents with    Instability of left shoulder joint    Chronic midline thoracic back pain       Is someone accompanying this pt? no    Is the patient using any DME equipment during OV? no    Depression Screenin/13/2024     4:08 PM 2024    11:34 AM 2024     1:29 PM 2024     3:45 PM 2023     1:46 PM 2023     3:30 PM 2023     2:47 PM   PHQ-9 Questionaire   Little interest or pleasure in doing things 0 0 0 0 0 0 0   Feeling down, depressed, or hopeless 0 0 0 0 0 0 0   Trouble falling or staying asleep, or sleeping too much   1       Feeling tired or having little energy   1       Poor appetite or overeating   0       Feeling bad about yourself - or that you are a failure or have let yourself or your family down   0       Trouble concentrating on things, such as reading the newspaper or watching television   0       Moving or speaking so slowly that other people could have noticed. Or the opposite - being so fidgety or restless that you have been moving around a lot more than usual   0       Thoughts that you would be better off dead, or of hurting yourself in some way   0       PHQ-9 Total Score 0 0 2 0 0 0 0   If you checked off any problems, how difficult have these problems made it for you to do your work, take care of things at home, or get along with other people?   1            ANN 7-Anxiety       2024     1:28 PM 2023     9:44 AM 3/15/2023     3:41 PM   ANN-7 SCREENING   Feeling nervous, anxious, or on edge Not at all Not at all Several days   Not being able to stop or control worrying Not at all Not at all Several days   Worrying too much about different things Not at all Not at all Several days   Trouble relaxing Several days Not at all Several days   Being so restless that it is hard to sit still Not at all Not at all Several days   Becoming easily annoyed or irritable Not at all Not at all Several

## 2024-11-13 NOTE — ASSESSMENT & PLAN NOTE
Ortho notes from 7/24/24 reviewed, noted micro instability, atraumatic, multidirectional, recommended PT, OTC tylenol/ibuprofen, do not recommend surgery. Follow up if symptoms persist

## 2024-11-13 NOTE — PROGRESS NOTES
Chief Complaint   Patient presents with    Instability of left shoulder joint    Chronic midline thoracic back pain     Assessment & Plan:     1. Instability of left shoulder joint  Assessment & Plan:  Ortho notes from 7/24/24 reviewed, noted micro instability, atraumatic, multidirectional, recommended PT, OTC tylenol/ibuprofen, do not recommend surgery. Follow up if symptoms persist  Orders:  -     Comprehensive Metabolic Panel; Future  -     CBC with Auto Differential; Future  2. Chronic midline thoracic back pain  Assessment & Plan:  Ortho notes from 8/1/24 reviewed, plan: continue exercises, not interested in surgery or injections, if pain worsens, consider pain management. Follow up as needed  Orders:  -     Comprehensive Metabolic Panel; Future  -     CBC with Auto Differential; Future  3. Nausea  Comments:  Intermittent, with shingles outbreak  Denies nausea, currently  Start PRN zofran, advised pt on potential adverse reactions, including QT prolongation  Orders:  -     ondansetron (ZOFRAN) 4 MG tablet; Take 1 tablet by mouth every 8 hours as needed for Nausea or Vomiting, Disp-30 tablet, R-1Normal  4. Herpes zoster with complication  Assessment & Plan:  Continue to follow up with endocrinology  5. Screening for lipid disorders  -     Comprehensive Metabolic Panel; Future  -     Lipid Panel; Future  6. Encounter for screening mammogram for breast cancer  -     [facility] MELVA DIGITAL SCREEN BILATERAL; Future    Follow-up and Dispositions    Return in about 3 months (around 2/13/2025) for PHYSICAL, Lab results.       Subjective:     HPI    Health Maintenance:  Mammogram- ordered    Left shoulder joint instability  Symptoms: states she left shoulder pain is better  Followed by Dr. Ruben kirby  Treatment:   - PT  - tylenol  - ibuprofen  States her chiropractor is doing a big difference    Chronic back pain  Symptoms: states back pain is better  States she gets more back pain in winter  Followed by spine

## 2024-11-13 NOTE — ASSESSMENT & PLAN NOTE
Ortho notes from 8/1/24 reviewed, plan: continue exercises, not interested in surgery or injections, if pain worsens, consider pain management. Follow up as needed